# Patient Record
Sex: MALE | Race: WHITE | NOT HISPANIC OR LATINO | Employment: PART TIME | URBAN - METROPOLITAN AREA
[De-identification: names, ages, dates, MRNs, and addresses within clinical notes are randomized per-mention and may not be internally consistent; named-entity substitution may affect disease eponyms.]

---
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
## 2020-05-28 ENCOUNTER — OFFICE VISIT (OUTPATIENT)
Dept: FAMILY MEDICINE CLINIC | Facility: CLINIC | Age: 20
End: 2020-05-28

## 2020-05-28 VITALS
HEIGHT: 74 IN | HEART RATE: 85 BPM | WEIGHT: 281.6 LBS | BODY MASS INDEX: 36.14 KG/M2 | SYSTOLIC BLOOD PRESSURE: 121 MMHG | TEMPERATURE: 98.6 F | OXYGEN SATURATION: 98 % | DIASTOLIC BLOOD PRESSURE: 73 MMHG

## 2020-05-28 DIAGNOSIS — F41.9 ANXIETY: ICD-10-CM

## 2020-05-28 DIAGNOSIS — F99 INSOMNIA DUE TO OTHER MENTAL DISORDER: ICD-10-CM

## 2020-05-28 DIAGNOSIS — Z00.00 PREVENTATIVE HEALTH CARE: Primary | ICD-10-CM

## 2020-05-28 DIAGNOSIS — F51.05 INSOMNIA DUE TO OTHER MENTAL DISORDER: ICD-10-CM

## 2020-05-28 PROBLEM — Z83.3 FAMILY HISTORY OF DIABETES MELLITUS IN FIRST DEGREE RELATIVE: Status: RESOLVED | Noted: 2018-08-02 | Resolved: 2020-05-28

## 2020-05-28 PROBLEM — E66.3 OVERWEIGHT: Status: ACTIVE | Noted: 2018-08-02

## 2020-05-28 PROBLEM — G47.00 INSOMNIA: Status: ACTIVE | Noted: 2018-08-02

## 2020-05-28 PROBLEM — E66.3 OVERWEIGHT: Status: RESOLVED | Noted: 2018-08-02 | Resolved: 2020-05-28

## 2020-05-28 PROBLEM — Z83.3 FAMILY HISTORY OF DIABETES MELLITUS IN FIRST DEGREE RELATIVE: Status: ACTIVE | Noted: 2018-08-02

## 2020-05-28 PROBLEM — G47.00 INSOMNIA: Status: RESOLVED | Noted: 2018-08-02 | Resolved: 2020-05-28

## 2020-05-28 PROCEDURE — 99204 OFFICE O/P NEW MOD 45 MIN: CPT | Performed by: NURSE PRACTITIONER

## 2020-05-28 RX ORDER — PHENOL 1.4 %
10 AEROSOL, SPRAY (ML) MUCOUS MEMBRANE DAILY
Qty: 30 TABLET | Refills: 2 | Status: SHIPPED | OUTPATIENT
Start: 2020-05-28

## 2021-09-27 ENCOUNTER — TELEPHONE (OUTPATIENT)
Dept: FAMILY MEDICINE CLINIC | Facility: CLINIC | Age: 21
End: 2021-09-27

## 2021-09-27 NOTE — TELEPHONE ENCOUNTER
Pt needs an appt.  Tried calling pt 3 times to schedule.  No answer.  Phone just asks to enter mail box #.  [id number]

## 2021-09-27 NOTE — TELEPHONE ENCOUNTER
Caller: Joaquín Andrade    Relationship to patient: Self    Best call back number:[phone]    Chief complaint: ANXIETY    Type of visit: OFFICE VISIT    Requested date: ASAP    Additional notes:PATIENT STOPPED MEDICATION AND IS NEEDING TO GO  BACK ON IT ASAP. PLEASE CALL AND ADVISE.

## 2021-09-27 NOTE — TELEPHONE ENCOUNTER
HUB READ MESSAGE TO PATIENT - HE WAS NOT HAPPY ABOUT SCHEDULING NEXT AVAILABLE APPOINTMENT WITH [facility] ON 10/16/21.

## 2021-09-30 ENCOUNTER — OFFICE VISIT (OUTPATIENT)
Dept: FAMILY MEDICINE CLINIC | Facility: CLINIC | Age: 21
End: 2021-09-30

## 2021-09-30 VITALS
WEIGHT: 289.6 LBS | RESPIRATION RATE: 16 BRPM | DIASTOLIC BLOOD PRESSURE: 81 MMHG | HEART RATE: 67 BPM | OXYGEN SATURATION: 97 % | BODY MASS INDEX: 39.22 KG/M2 | SYSTOLIC BLOOD PRESSURE: 128 MMHG | TEMPERATURE: 98.9 F | HEIGHT: 72 IN

## 2021-09-30 DIAGNOSIS — F33.2 SEVERE EPISODE OF RECURRENT MAJOR DEPRESSIVE DISORDER, WITHOUT PSYCHOTIC FEATURES (HCC): Primary | ICD-10-CM

## 2021-09-30 DIAGNOSIS — F41.9 ANXIETY: ICD-10-CM

## 2021-09-30 PROCEDURE — 99214 OFFICE O/P EST MOD 30 MIN: CPT | Performed by: NURSE PRACTITIONER

## 2021-09-30 NOTE — ASSESSMENT & PLAN NOTE
Antidepressant restarted today.  Referral to mental health services and therapist.  Patient advised to go to the ER self-harm or harm towards others or other psychological issues.

## 2021-09-30 NOTE — ASSESSMENT & PLAN NOTE
Patient's depression is recurrent and is severe without psychosis. Their depression is currently active and the condition is worsening. This will be reassessed in 4 weeks. F/U as described:patient was prescribed an antidepressant medicine and patient referred to Mental Health Specialist.    Patient urged to call 911 if he has any thoughts of ideation harm towards others.  Patient verbalized understanding.

## 2021-09-30 NOTE — PROGRESS NOTES
"Chief Complaint  Depression    \"I am really depressed\".    Subjective          Joaquín Andrade presents to [facility]     21-year-old male patient presents today with severe depression.  This has been a recurrent issue for him the past few years.  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 His mother has a history of anxiety and depression as well as his siblings.      He reports he is restless, has difficulty sleeping, and feels anxious.  He reports that recently he has been \"having nightmares\" about his former EMT runs.  He has been off and on his antidepressant periodically over the last few years.  Previous notes show that patient stopped medication because he was feeling well.  Upon discussion today patient said that is why he did not get back on his medication was \"because he was doing well\".  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  He reports he is working three 13-hour shifts a week.  He has a fiancée who is about to finish school.  He also has a pet cat.    Patient reports that on Tuesday, September 28 he was considering self-harm.  He reports \"I was thinking about slitting my wrists until my cat walked in\".  He denies actually having anxiety reports it was \"only a thought\". He states he sat with the cat for a bit then informed his parents about what he was thinking. He reports they sat down and talked with him and encouraged him to make an appointment with his provider to get back on medication.      He reports that the past 6 months have been the worst and his fiancée has urged him to do either therapy or counseling or get back on medication.  He states \"I just been brushing and under the rug\".  Patient does not note any past trauma, sexual abuse, or other issues that may be triggering this depression.  However, encouraged patient that he needs to see someone to help learn healthy techniques to deal with some " "of the symptoms he is feeling while also on antidepressant therapy.  He denies any thoughts of self-harm today or harm towards others.    Instructed patient to go to the ER, [facility] or [facility] today however, he denies thoughts of self harm currently. Urged pt that if he has ANY thoughts of self-harm or harm towards others to seek emergency help right away.  Patient verbalized understanding.  He reports that he does not have suicidal thoughts today and will be leaving with his parents to go see his brother this weekend who is studying at [employer].    *Patient reports he did recover from Covid recently and was diagnosed 3 weeks ago.  He has no lingering symptoms to note.        Objective   Vital Signs:   /81 (BP Location: Right arm, Patient Position: Sitting, Cuff Size: Large Adult)   Pulse 67   Temp 98.9 °F (37.2 °C) (Infrared)   Resp 16   Ht 182.9 cm (72\")   Wt 131 kg (289 lb 9.6 oz)   SpO2 97%   BMI 39.28 kg/m²       Physical Exam  Constitutional:       Appearance: He is well-developed.      Comments: Wearing a face mask     HENT:      Head: Normocephalic and atraumatic.   Eyes:      Conjunctiva/sclera: Conjunctivae normal.   Cardiovascular:      Rate and Rhythm: Normal rate.   Pulmonary:      Effort: Pulmonary effort is normal.   Musculoskeletal:         General: Normal range of motion.      Cervical back: Normal range of motion.   Skin:     General: Skin is warm and dry.      Findings: No rash.   Neurological:      Mental Status: He is alert and oriented to person, place, and time.   Psychiatric:         Attention and Perception: He does not perceive auditory or visual hallucinations.         Mood and Affect: Mood is anxious and depressed. Affect is flat. Affect is not tearful.         Speech: Speech normal.         Behavior: Behavior is withdrawn. Behavior is not agitated, aggressive or hyperactive.         Thought Content: Thought content is not paranoid. Thought content includes suicidal ideation. " Thought content does not include homicidal ideation. Thought content does not include homicidal or suicidal plan.        Result Review :                 Assessment and Plan    Diagnoses and all orders for this visit:    1. Severe episode of recurrent major depressive disorder, without psychotic features (HCC) (Primary)  -     Ambulatory Referral to Behavioral Health    2. Anxiety    Other orders  -     sertraline (Zoloft) 50 MG tablet; Take 1 tablet by mouth Daily.  Dispense: 30 tablet; Refill: 0        Follow Up   Return in about 4 weeks (around 10/28/2021).  Patient was given instructions and counseling regarding his condition or for health maintenance advice. Please see specific information pulled into the AVS if appropriate.

## 2021-10-26 ENCOUNTER — OFFICE VISIT (OUTPATIENT)
Dept: FAMILY MEDICINE CLINIC | Facility: CLINIC | Age: 21
End: 2021-10-26

## 2021-10-26 VITALS
RESPIRATION RATE: 16 BRPM | DIASTOLIC BLOOD PRESSURE: 85 MMHG | HEART RATE: 71 BPM | OXYGEN SATURATION: 98 % | BODY MASS INDEX: 39.14 KG/M2 | HEIGHT: 72 IN | TEMPERATURE: 98.2 F | SYSTOLIC BLOOD PRESSURE: 128 MMHG | WEIGHT: 289 LBS

## 2021-10-26 DIAGNOSIS — F33.2 SEVERE EPISODE OF RECURRENT MAJOR DEPRESSIVE DISORDER, WITHOUT PSYCHOTIC FEATURES (HCC): Primary | ICD-10-CM

## 2021-10-26 DIAGNOSIS — F41.9 ANXIETY: ICD-10-CM

## 2021-10-26 DIAGNOSIS — Z00.00 ANNUAL PHYSICAL EXAM: ICD-10-CM

## 2021-10-26 PROCEDURE — 99213 OFFICE O/P EST LOW 20 MIN: CPT | Performed by: NURSE PRACTITIONER

## 2021-10-26 NOTE — ASSESSMENT & PLAN NOTE
Patient's depression is recurrent and is mild without psychosis. Their depression is currently in partial remission and the condition is improving with treatment. This will be reassessed in 3 months. F/U as described:patient will continue current medication therapy.  No medication adjustments at this time.

## 2021-10-26 NOTE — PROGRESS NOTES
"Chief Complaint  Annual Exam and Depression    \"Here to see how I'm doing with my meds\"    Subjective          Joaquín Andrade presents to [facility]     21-year-old male patient here for follow-up on depression and anxiety.  Patient reports he is \"feeling better overall \".  He notes that his anxiety is improved and when it occurs he is able to control it.  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  Patient denies needing anything to help with sleep however, I reiterated that if sleep is being disturbed he needs to reach out so we can address it.  Patient verbalized understanding.  He reports that his relationship is going well and his girlfriend is happy with his improved mood.  He denies suicidal ideation, thoughts of self-harm, or harm towards others.  Form patient if any of those thoughts come back he needs to seek emergency help right away.  Vital signs are normal and stable at today's visit.    Depression  Visit Type: follow-up  Patient presents with the following symptoms: nervousness/anxiety.  Patient is not experiencing: anhedonia, chest pain, choking sensation, decreased concentration, depressed mood, excessive worry, feelings of hopelessness, impotence, irritability, palpitations, panic, psychomotor agitation, shortness of breath, suicidal ideas, suicidal planning and thoughts of death.  Frequency of symptoms: occasionally   Severity: mild   Sleep quality: good (occassional nightmares)  Nighttime awakenings: occasional  Compliance with medications: 100% per patient.  Treatment side effects: denies any.       Objective   Vital Signs:   /85 (BP Location: Right arm, Patient Position: Sitting, Cuff Size: Adult)   Pulse 71   Temp 98.2 °F (36.8 °C) (Infrared)   Resp 16   Ht 182.9 cm (72\")   Wt 131 kg (289 lb)   SpO2 98%   BMI 39.20 kg/m²       Physical Exam  Constitutional:       Appearance: Normal appearance. He " is well-developed.      Comments: Wearing a face mask     HENT:      Head: Normocephalic and atraumatic.      Right Ear: Tympanic membrane, ear canal and external ear normal.      Left Ear: Tympanic membrane, ear canal and external ear normal.      Nose: Nose normal.      Mouth/Throat:      Mouth: Mucous membranes are moist.      Pharynx: Oropharynx is clear.   Eyes:      Conjunctiva/sclera: Conjunctivae normal.      Pupils: Pupils are equal, round, and reactive to light.   Cardiovascular:      Rate and Rhythm: Normal rate and regular rhythm.      Pulses: Normal pulses.      Heart sounds: Normal heart sounds.   Pulmonary:      Effort: Pulmonary effort is normal.      Breath sounds: Normal breath sounds.   Abdominal:      General: Bowel sounds are normal.      Palpations: Abdomen is soft.   Musculoskeletal:         General: Normal range of motion.      Cervical back: Normal range of motion and neck supple.   Lymphadenopathy:      Head:      Right side of head: No submental, submandibular, tonsillar, preauricular, posterior auricular or occipital adenopathy.      Left side of head: No submental, submandibular, tonsillar, preauricular, posterior auricular or occipital adenopathy.      Cervical: No cervical adenopathy.      Right cervical: No superficial, deep or posterior cervical adenopathy.     Left cervical: No superficial, deep or posterior cervical adenopathy.   Skin:     General: Skin is warm and dry.      Findings: No rash.   Neurological:      Mental Status: He is alert and oriented to person, place, and time.   Psychiatric:         Behavior: Behavior normal.        Result Review :                 Assessment and Plan    Diagnoses and all orders for this visit:    1. Severe episode of recurrent major depressive disorder, without psychotic features (HCC) (Primary)  Assessment & Plan:  Patient's depression is recurrent and is mild without psychosis. Their depression is currently in partial remission and the  condition is improving with treatment. This will be reassessed in 3 months. F/U as described:patient will continue current medication therapy.  No medication adjustments at this time.      2. Anxiety  Assessment & Plan:  Anxiety is improved.  Patient is able to control when episodes arise.      3. Annual physical exam  Assessment & Plan:  Physical performed.      Other orders  -     sertraline (Zoloft) 50 MG tablet; Take 1 tablet by mouth Daily.  Dispense: 30 tablet; Refill: 3      Follow Up   Return in about 3 months (around 1/26/2022).  Patient was given instructions and counseling regarding his condition or for health maintenance advice. Please see specific information pulled into the AVS if appropriate.

## 2021-11-12 ENCOUNTER — APPOINTMENT (OUTPATIENT)
Dept: GENERAL RADIOLOGY | Facility: HOSPITAL | Age: 21
End: 2021-11-12

## 2021-11-12 ENCOUNTER — HOSPITAL ENCOUNTER (EMERGENCY)
Facility: HOSPITAL | Age: 21
Discharge: HOME | End: 2021-11-12
Admitting: EMERGENCY MEDICINE

## 2021-11-12 VITALS
WEIGHT: 280 LBS | OXYGEN SATURATION: 97 % | TEMPERATURE: 97.8 F | SYSTOLIC BLOOD PRESSURE: 143 MMHG | BODY MASS INDEX: 37.93 KG/M2 | RESPIRATION RATE: 16 BRPM | HEART RATE: 94 BPM | DIASTOLIC BLOOD PRESSURE: 83 MMHG | HEIGHT: 72 IN

## 2021-11-12 DIAGNOSIS — D72.829 LEUKOCYTOSIS, UNSPECIFIED TYPE: ICD-10-CM

## 2021-11-12 DIAGNOSIS — E86.0 DEHYDRATION: ICD-10-CM

## 2021-11-12 DIAGNOSIS — R55 NEAR SYNCOPE: Primary | ICD-10-CM

## 2021-11-12 DIAGNOSIS — I95.1 ORTHOSTATIC HYPOTENSION: ICD-10-CM

## 2021-11-12 DIAGNOSIS — R82.4 KETONURIA: ICD-10-CM

## 2021-11-12 LAB
ALBUMIN SERPL-MCNC: 4.7 G/DL (ref 3.5–5.2)
ALBUMIN/GLOB SERPL: 1.7 G/DL
ALP SERPL-CCNC: 88 U/L (ref 39–117)
ALT SERPL W P-5'-P-CCNC: 29 U/L (ref 1–41)
ANION GAP SERPL CALCULATED.3IONS-SCNC: 12 MMOL/L (ref 5–15)
AST SERPL-CCNC: 24 U/L (ref 1–40)
BASOPHILS # BLD AUTO: 0 10*3/MM3 (ref 0–0.2)
BASOPHILS NFR BLD AUTO: 0.4 % (ref 0–1.5)
BILIRUB SERPL-MCNC: 0.6 MG/DL (ref 0–1.2)
BILIRUB UR QL STRIP: NEGATIVE
BUN SERPL-MCNC: 14 MG/DL (ref 6–20)
BUN/CREAT SERPL: 17.1 (ref 7–25)
CALCIUM SPEC-SCNC: 9.5 MG/DL (ref 8.6–10.5)
CHLORIDE SERPL-SCNC: 102 MMOL/L (ref 98–107)
CLARITY UR: CLEAR
CO2 SERPL-SCNC: 25 MMOL/L (ref 22–29)
COLOR UR: YELLOW
CREAT SERPL-MCNC: 0.82 MG/DL (ref 0.76–1.27)
DEPRECATED RDW RBC AUTO: 41.1 FL (ref 37–54)
EOSINOPHIL # BLD AUTO: 0.2 10*3/MM3 (ref 0–0.4)
EOSINOPHIL NFR BLD AUTO: 1.2 % (ref 0.3–6.2)
ERYTHROCYTE [DISTWIDTH] IN BLOOD BY AUTOMATED COUNT: 14.2 % (ref 12.3–15.4)
GFR SERPL CREATININE-BSD FRML MDRD: 117 ML/MIN/1.73
GLOBULIN UR ELPH-MCNC: 2.7 GM/DL
GLUCOSE SERPL-MCNC: 89 MG/DL (ref 65–99)
GLUCOSE UR STRIP-MCNC: NEGATIVE MG/DL
HCT VFR BLD AUTO: 45.1 % (ref 37.5–51)
HGB BLD-MCNC: 15.7 G/DL (ref 13–17.7)
HGB UR QL STRIP.AUTO: NEGATIVE
KETONES UR QL STRIP: ABNORMAL
LEUKOCYTE ESTERASE UR QL STRIP.AUTO: NEGATIVE
LYMPHOCYTES # BLD AUTO: 2.6 10*3/MM3 (ref 0.7–3.1)
LYMPHOCYTES NFR BLD AUTO: 20.1 % (ref 19.6–45.3)
MAGNESIUM SERPL-MCNC: 2.1 MG/DL (ref 1.6–2.6)
MCH RBC QN AUTO: 28.3 PG (ref 26.6–33)
MCHC RBC AUTO-ENTMCNC: 34.7 G/DL (ref 31.5–35.7)
MCV RBC AUTO: 81.5 FL (ref 79–97)
MONOCYTES # BLD AUTO: 0.7 10*3/MM3 (ref 0.1–0.9)
MONOCYTES NFR BLD AUTO: 5.3 % (ref 5–12)
NEUTROPHILS NFR BLD AUTO: 73 % (ref 42.7–76)
NEUTROPHILS NFR BLD AUTO: 9.5 10*3/MM3 (ref 1.7–7)
NITRITE UR QL STRIP: NEGATIVE
NRBC BLD AUTO-RTO: 0.1 /100 WBC (ref 0–0.2)
PH UR STRIP.AUTO: 7 [PH] (ref 5–8)
PLATELET # BLD AUTO: 237 10*3/MM3 (ref 140–450)
PMV BLD AUTO: 9.4 FL (ref 6–12)
POTASSIUM SERPL-SCNC: 4.1 MMOL/L (ref 3.5–5.2)
PROT SERPL-MCNC: 7.4 G/DL (ref 6–8.5)
PROT UR QL STRIP: NEGATIVE
RBC # BLD AUTO: 5.53 10*6/MM3 (ref 4.14–5.8)
SARS-COV-2 RNA PNL SPEC NAA+PROBE: NOT DETECTED
SODIUM SERPL-SCNC: 139 MMOL/L (ref 136–145)
SP GR UR STRIP: 1.02 (ref 1–1.03)
TROPONIN T SERPL-MCNC: <0.01 NG/ML (ref 0–0.03)
TSH SERPL DL<=0.05 MIU/L-ACNC: 1.42 UIU/ML (ref 0.27–4.2)
UROBILINOGEN UR QL STRIP: ABNORMAL
WBC # BLD AUTO: 13 10*3/MM3 (ref 3.4–10.8)

## 2021-11-12 PROCEDURE — 85025 COMPLETE CBC W/AUTO DIFF WBC: CPT | Performed by: PHYSICIAN ASSISTANT

## 2021-11-12 PROCEDURE — 84443 ASSAY THYROID STIM HORMONE: CPT | Performed by: PHYSICIAN ASSISTANT

## 2021-11-12 PROCEDURE — 81003 URINALYSIS AUTO W/O SCOPE: CPT | Performed by: PHYSICIAN ASSISTANT

## 2021-11-12 PROCEDURE — 71045 X-RAY EXAM CHEST 1 VIEW: CPT

## 2021-11-12 PROCEDURE — 99283 EMERGENCY DEPT VISIT LOW MDM: CPT

## 2021-11-12 PROCEDURE — 87635 SARS-COV-2 COVID-19 AMP PRB: CPT | Performed by: PHYSICIAN ASSISTANT

## 2021-11-12 PROCEDURE — 93005 ELECTROCARDIOGRAM TRACING: CPT | Performed by: PHYSICIAN ASSISTANT

## 2021-11-12 PROCEDURE — 83735 ASSAY OF MAGNESIUM: CPT | Performed by: PHYSICIAN ASSISTANT

## 2021-11-12 PROCEDURE — 36415 COLL VENOUS BLD VENIPUNCTURE: CPT

## 2021-11-12 PROCEDURE — 80053 COMPREHEN METABOLIC PANEL: CPT | Performed by: PHYSICIAN ASSISTANT

## 2021-11-12 PROCEDURE — 84484 ASSAY OF TROPONIN QUANT: CPT | Performed by: PHYSICIAN ASSISTANT

## 2021-11-12 RX ORDER — SODIUM CHLORIDE 0.9 % (FLUSH) 0.9 %
10 SYRINGE (ML) INJECTION AS NEEDED
Status: DISCONTINUED | OUTPATIENT
Start: 2021-11-12 | End: 2021-11-12 | Stop reason: HOSPADM

## 2021-11-12 RX ADMIN — SODIUM CHLORIDE 1000 ML: 9 INJECTION, SOLUTION INTRAVENOUS at 17:58

## 2021-11-12 NOTE — ED PROVIDER NOTES
Subjective       Patient is a 21-year-old male comes in complaining of lightheadedness and near syncopal episode while at work about 2 hours prior to arrival.  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  Patient states he did not have any vertigo symptoms or dizziness as previously stated in triage.  Patient states he had the feeling like he got up too fast and had tunnel vision and had to sit down and this relieved after several minutes.  Patient states he still fell off since that time.  Patient states he ate breakfast and lunch and had coffee today as well as hydrated with lots of water throughout the day today.  Patient otherwise denies fever, chills, headache, chest pain, shortness of breath, abdominal pain, nausea, vomiting, diarrhea, urinary symptoms or swelling in his legs bilaterally.  Patient states his work has been slightly more stressful recently as this is high season for him but states that his anxiety is under control and was recently started on medication about a month ago and denies any SI or HI since he started this medication.  Patient states he is set to start therapy in January for anxiety.          Review of Systems   Constitutional: Negative for chills, fatigue and fever.   HENT: Negative for congestion, sore throat, tinnitus and trouble swallowing.    Eyes: Negative for photophobia, discharge and visual disturbance.   Respiratory: Negative for cough, shortness of breath and wheezing.    Cardiovascular: Negative for chest pain, palpitations and leg swelling.   Gastrointestinal: Negative for abdominal pain, blood in stool, diarrhea, nausea and vomiting.   Genitourinary: Negative for dysuria, flank pain, frequency and urgency.   Musculoskeletal: Negative for arthralgias and myalgias.   Skin: Negative for rash.   Neurological: Positive for syncope (near syncope),  light-headedness and numbness. Negative for dizziness, weakness and headaches.   Psychiatric/Behavioral: Negative for confusion.       Past Medical History:   Diagnosis Date   • Anxiety    • Depression    • Family history of diabetes mellitus in first degree relative 8/2/2018       No Known Allergies    History reviewed. No pertinent surgical history.    Family History   Problem Relation Age of Onset   • Anxiety disorder Mother    • Depression Mother    • Diabetes Father        Social History     Socioeconomic History   • Marital status: Single   Tobacco Use   • Smoking status: Current Some Day Smoker     Types: Cigars   • Smokeless tobacco: Never Used   • Tobacco comment: rarely    Vaping Use   • Vaping Use: Never used   Substance and Sexual Activity   • Alcohol use: Yes     Comment: occasionally    • Drug use: Never   • Sexual activity: Defer           Objective   Physical Exam  Vitals and nursing note reviewed.   Constitutional:       General: He is not in acute distress.     Appearance: He is well-developed. He is not diaphoretic.      Comments: Patient appears somewhat anxious.   HENT:      Head: Normocephalic and atraumatic.      Right Ear: Ear canal and external ear normal.      Left Ear: Ear canal and external ear normal.      Nose: Nose normal.      Mouth/Throat:      Pharynx: No oropharyngeal exudate.   Eyes:      Extraocular Movements: Extraocular movements intact.      Conjunctiva/sclera: Conjunctivae normal.      Pupils: Pupils are equal, round, and reactive to light.   Cardiovascular:      Rate and Rhythm: Normal rate and regular rhythm.      Heart sounds: Normal heart sounds.      Comments: S1, S2 audible.  Pulmonary:      Effort: Pulmonary effort is normal. No respiratory distress.      Breath sounds: Normal breath sounds. No wheezing, rhonchi or rales.      Comments: On room air.  Abdominal:      General: Bowel sounds are normal. There is no distension.      Palpations: Abdomen is soft.       "Tenderness: There is no abdominal tenderness. There is no guarding or rebound.   Musculoskeletal:         General: No tenderness or deformity. Normal range of motion.      Cervical back: Normal range of motion.      Right lower leg: No edema.      Left lower leg: No edema.   Skin:     General: Skin is warm.      Capillary Refill: Capillary refill takes less than 2 seconds.      Findings: No erythema or rash.   Neurological:      Mental Status: He is alert and oriented to person, place, and time.      Cranial Nerves: No cranial nerve deficit.   Psychiatric:         Mood and Affect: Mood normal.         Behavior: Behavior normal.         Procedures           ED Course      /83 (BP Location: Right arm, Patient Position: Sitting)   Pulse 94   Temp 97.8 °F (36.6 °C)   Resp 16   Ht 182.9 cm (72\")   Wt 127 kg (280 lb)   SpO2 97%   BMI 37.97 kg/m²   Labs Reviewed   CBC WITH AUTO DIFFERENTIAL - Abnormal; Notable for the following components:       Result Value    WBC 13.00 (*)     Neutrophils, Absolute 9.50 (*)     All other components within normal limits    Narrative:     Result checked     URINALYSIS W/ CULTURE IF INDICATED - Abnormal; Notable for the following components:    Ketones, UA Trace (*)     All other components within normal limits    Narrative:     Urine microscopic not indicated.   COVID-19,CEPHEID/LISA/BDMAX,COR/RAFAELA/PAD/ISABEL IN-HOUSE,NP SWAB IN TRANSPORT MEDIA 3-4 HR TAT, RT-PCR - Normal    Narrative:     Fact sheet for providers: https://www.fda.gov/media/018500/download     Fact sheet for patients: https://www.fda.gov/media/868515/download  Fact sheet for providers: https://www.fda.gov/media/345495/download    Fact sheet for patients: https://www.fda.gov/media/430601/download    Test performed by PCR.   TROPONIN (IN-HOUSE) - Normal    Narrative:     Troponin T Reference Range:  <= 0.03 ng/mL-   Negative for AMI  >0.03 ng/mL-     Abnormal for myocardial necrosis.  Clinicians would have to " utilize clinical acumen, EKG, Troponin and serial changes to determine if it is an Acute Myocardial Infarction or myocardial injury due to an underlying chronic condition.       Results may be falsely decreased if patient taking Biotin.     TSH - Normal   MAGNESIUM - Normal   COMPREHENSIVE METABOLIC PANEL    Narrative:     GFR Normal >60  Chronic Kidney Disease <60  Kidney Failure <15     CBC AND DIFFERENTIAL    Narrative:     The following orders were created for panel order CBC & Differential.  Procedure                               Abnormality         Status                     ---------                               -----------         ------                     CBC Auto Differential[[id number]]        Abnormal            Final result                 Please view results for these tests on the individual orders.     XR Chest 1 View    Result Date: 11/12/2021  Low lung volumes. There is no active pulmonary disease.  Electronically Signed By-Rodolfo Rick MD On:11/12/2021 5:45 PM This report was finalized on [id number] by  Rodolfo Rick MD.                                         MDM  Number of Diagnoses or Management Options     Amount and/or Complexity of Data Reviewed  Clinical lab tests: reviewed  Tests in the radiology section of CPT®: reviewed  Tests in the medicine section of CPT®: reviewed    Risk of Complications, Morbidity, and/or Mortality  Presenting problems: low  Diagnostic procedures: low  Management options: low    Patient Progress  Patient progress: improved       Chart review:    EKG: EKG reviewed by myself interpreted by , shows sinus rhythm 65 bpm, probable normal early repolarization pattern, no ST elevation apparent.    Imaging:    XR Chest 1 View   Final Result   Low lung volumes. There is no active pulmonary disease.       Electronically Signed By-Rodolfo Rick MD On:11/12/2021 5:45 PM   This report was finalized on [id number] by  Rodolfo Rick MD.          Labs: UA shows trace  "ketones, white blood cell count of 13,000 otherwise unremarkable CBC, CMP, magnesium, TSH and initial troponin negative.  COVID-19 swab negative.    Vitals:  /83 (BP Location: Right arm, Patient Position: Sitting)   Pulse 94   Temp 97.8 °F (36.6 °C)   Resp 16   Ht 182.9 cm (72\")   Wt 127 kg (280 lb)   SpO2 97%   BMI 37.97 kg/m²     Medications given:    Medications   sodium chloride 0.9 % bolus 1,000 mL (0 mL Intravenous Stopped 11/12/21 1828)       Procedures:    MDM: Patient is a 21-year-old male comes in complaining of near syncopal episode at work today.  Patient has symptoms consistent with dehydration as patient is orthostatic here in the ER and has trace ketones in his urine.  Patient was given 1 L normal saline bolus and patient reports improvement of symptoms.  White blood cell count was elevated at 13,000 but patient has no signs of infection on urine or chest x-ray.  Initial troponin and EKG are unremarkable.  CMP unremarkable for acute findings as well.  COVID-19 swab negative.  TSH and magnesium are normal.  Patient to follow-up with primary care in 1 week's time for lab recheck regarding leukocytosis and patient voiced understanding.  Patient was given strict return precautions and voiced understanding.  Patient was encouraged to hydrate more thoroughly throughout the day and patient voiced understanding of this plan. See full discharge instructions for further details.  Results and plan discussed with patient and is agreeable with plan.    Final diagnoses:   Near syncope   Orthostatic hypotension   Dehydration   Ketonuria   Leukocytosis, unspecified type       ED Disposition  ED Disposition     ED Disposition Condition Comment    Discharge Stable           [facility] EMERGENCY DEPARTMENT  [address] [facility] [address] [phone]  Go in 1 day  As needed, If symptoms worsen    Niya Andino, APRN  [address]  [address]  [place] " [address] [phone]    Call in 1 week  As needed for lab recheck for elevated WBC         Medication List      Changed    Melatonin 10 MG tablet  Take 1 tablet by mouth Daily. One tab at bedtime  What changed:   · when to take this  · reasons to take this  · additional instructions             Zeke Khan PA  11/12/21 1236

## 2021-11-13 NOTE — DISCHARGE INSTRUCTIONS
Please continue to hydrate with plenty of fluids throughout the day especially while working.  Please follow-up with your primary care provider in 1 week's time for lab recheck and symptom follow-up.  Please come back to the ER if you pass out or have high fever or severe pain as you will need reevaluation at that time.

## 2021-11-16 LAB — QT INTERVAL: 377 MS

## 2022-01-04 ENCOUNTER — OFFICE VISIT (OUTPATIENT)
Dept: FAMILY MEDICINE CLINIC | Facility: CLINIC | Age: 22
End: 2022-01-04

## 2022-01-04 ENCOUNTER — TELEPHONE (OUTPATIENT)
Dept: FAMILY MEDICINE CLINIC | Facility: CLINIC | Age: 22
End: 2022-01-04

## 2022-01-04 VITALS
SYSTOLIC BLOOD PRESSURE: 151 MMHG | OXYGEN SATURATION: 97 % | DIASTOLIC BLOOD PRESSURE: 84 MMHG | HEIGHT: 72 IN | BODY MASS INDEX: 39.44 KG/M2 | HEART RATE: 87 BPM | RESPIRATION RATE: 18 BRPM | WEIGHT: 291.2 LBS | TEMPERATURE: 99.3 F

## 2022-01-04 DIAGNOSIS — M41.24 OTHER IDIOPATHIC SCOLIOSIS, THORACIC REGION: ICD-10-CM

## 2022-01-04 DIAGNOSIS — M54.6 ACUTE RIGHT-SIDED THORACIC BACK PAIN: Primary | ICD-10-CM

## 2022-01-04 DIAGNOSIS — M51.34 DDD (DEGENERATIVE DISC DISEASE), THORACIC: Primary | ICD-10-CM

## 2022-01-04 PROCEDURE — 99214 OFFICE O/P EST MOD 30 MIN: CPT | Performed by: NURSE PRACTITIONER

## 2022-01-04 RX ORDER — METHYLPREDNISOLONE 4 MG/1
TABLET ORAL
Qty: 21 EACH | Refills: 0 | Status: SHIPPED | OUTPATIENT
Start: 2022-01-04 | End: 2022-01-09

## 2022-01-04 RX ORDER — CYCLOBENZAPRINE HCL 5 MG
5 TABLET ORAL 3 TIMES DAILY PRN
Qty: 9 TABLET | Refills: 0 | Status: SHIPPED | OUTPATIENT
Start: 2022-01-04 | End: 2022-01-27 | Stop reason: SDUPTHER

## 2022-01-04 NOTE — ASSESSMENT & PLAN NOTE
Advised patient to apply heat and ice.  We will send in low-dose Medrol pack and a few days of Flexeril.

## 2022-01-04 NOTE — PROGRESS NOTES
These advise Joaquín that he has a T4 and T5 degenerative disc disease.  This is mild but this is probably pinching a nerve that is causing his discomfort.  I have sent in steroids and muscle relaxers for his discomfort.  If he is having continuation of symptoms he can return to me and we will elaborate further on what to do next

## 2022-01-04 NOTE — PROGRESS NOTES
"Chief Complaint  Back Pain    Subjective          Joaquín Andrade presents to [facility]      History of Present Illness    Joaquín is a 21-year-old male patient who presents today with back pain.  He reports that yesterday he came home from work, showered, laid down for bed and back was with \"sharp pain that almost made him vomit\". Pain was \"intense\" and rated as an \"8-9/10\" last night and a 3-4/10 today. He did take 800 mg ibuprofen appx 2 am. He had to sleep on floor to flatten his back for comfort. He was able to sleep a little however, he did not get much rest. He reports the pain is still present and mostly when he breathes in and out. He does not recall any trauma, heavy lifting, issues before this pain began.  I will obtain a thoracic x-ray.  Most likely patient has an impinged nerve or a muscle strain.     Objective     Vital Signs:   /84 (BP Location: Left arm, Patient Position: Sitting, Cuff Size: Adult)   Pulse 87   Temp 99.3 °F (37.4 °C) (Infrared)   Resp 18   Ht 182.9 cm (72\")   Wt 132 kg (291 lb 3.2 oz)   SpO2 97%   BMI 39.49 kg/m²           Physical Exam  Constitutional:       Appearance: Normal appearance. He is well-developed.      Comments: Wearing a face mask     HENT:      Head: Normocephalic and atraumatic.   Eyes:      Conjunctiva/sclera: Conjunctivae normal.   Cardiovascular:      Rate and Rhythm: Normal rate.   Pulmonary:      Effort: Pulmonary effort is normal.   Musculoskeletal:         General: Normal range of motion.      Cervical back: Normal and normal range of motion.      Thoracic back: Spasms and tenderness present. No swelling, edema, deformity, signs of trauma, lacerations or bony tenderness. Normal range of motion. No scoliosis.        Back:    Skin:     General: Skin is warm and dry.      Findings: No rash.   Neurological:      Mental Status: He is alert and oriented to person, place, and time.   Psychiatric:         Behavior: " Behavior normal.                Result Review :                                   Assessment and Plan      Diagnoses and all orders for this visit:    1. Acute right-sided thoracic back pain (Primary)  Assessment & Plan:  Advised patient to apply heat and ice.  We will send in low-dose Medrol pack and a few days of Flexeril.    Orders:  -     XR Spine Thoracic 2 View (In Office)    Other orders  -     methylPREDNISolone (MEDROL) 4 MG dose pack; Take as directed on package instructions.  Dispense: 21 each; Refill: 0  -     cyclobenzaprine (FLEXERIL) 5 MG tablet; Take 1 tablet by mouth 3 (Three) Times a Day As Needed for Muscle Spasms.  Dispense: 9 tablet; Refill: 0          Follow Up       Return for Next scheduled follow up, with SURYA Roberts.      Patient was given instructions and counseling regarding his condition or for health maintenance advice. Please see specific information pulled into the AVS if appropriate.     Niya Andino, APRN1/4/202215:43 EST  This note has been electronically signed

## 2022-01-04 NOTE — TELEPHONE ENCOUNTER
HUB TO READ    MY CHART MESSAGE     These advise Joaquín that he has a T4 and T5 degenerative disc disease.  This is mild but this is probably pinching a nerve that is causing his discomfort.  I have sent in steroids and muscle relaxers for his discomfort.  If he is having continuation of symptoms he can return to me and we will elaborate further on what to do next

## 2022-01-27 ENCOUNTER — OFFICE VISIT (OUTPATIENT)
Dept: FAMILY MEDICINE CLINIC | Facility: CLINIC | Age: 22
End: 2022-01-27

## 2022-01-27 VITALS
RESPIRATION RATE: 18 BRPM | BODY MASS INDEX: 39.82 KG/M2 | DIASTOLIC BLOOD PRESSURE: 88 MMHG | HEIGHT: 72 IN | OXYGEN SATURATION: 98 % | HEART RATE: 80 BPM | SYSTOLIC BLOOD PRESSURE: 135 MMHG | WEIGHT: 294 LBS | TEMPERATURE: 98.6 F

## 2022-01-27 DIAGNOSIS — M51.34 DDD (DEGENERATIVE DISC DISEASE), THORACIC: ICD-10-CM

## 2022-01-27 DIAGNOSIS — F41.9 ANXIETY: Primary | ICD-10-CM

## 2022-01-27 PROCEDURE — 99214 OFFICE O/P EST MOD 30 MIN: CPT | Performed by: NURSE PRACTITIONER

## 2022-01-27 RX ORDER — SERTRALINE HYDROCHLORIDE 100 MG/1
100 TABLET, FILM COATED ORAL DAILY
Qty: 30 TABLET | Refills: 1 | Status: SHIPPED | OUTPATIENT
Start: 2022-01-27 | End: 2022-02-09 | Stop reason: SDUPTHER

## 2022-01-27 RX ORDER — CYCLOBENZAPRINE HCL 5 MG
5 TABLET ORAL 3 TIMES DAILY PRN
Qty: 12 TABLET | Refills: 0 | Status: SHIPPED | OUTPATIENT
Start: 2022-01-27 | End: 2022-02-09 | Stop reason: SDUPTHER

## 2022-01-27 RX ORDER — SERTRALINE HYDROCHLORIDE 100 MG/1
100 TABLET, FILM COATED ORAL DAILY
Qty: 30 TABLET | Refills: 1 | Status: SHIPPED | OUTPATIENT
Start: 2022-01-27 | End: 2022-01-27 | Stop reason: SDUPTHER

## 2022-01-27 NOTE — PROGRESS NOTES
"Chief Complaint  Depression and Back Pain    Subjective          Joaquín Andrade presents to [facility]      Joaquín is a 21-year-old male patient who presents today to follow-up on his anxiety.  He is currently taking Zoloft 50 mg daily.  We restarted him on this medication at the end of September 2021.  We had a follow-up 1 month later and he was doing well.    He reports very small episodes of anxiety that are remaining.  He did start seeing a therapist at [facility] this past week.  He reports his next appointment is the middle of February and his therapist will be seeing him every 2 weeks once she is able to open her private practice.  Her name is Leighann Cm.  He does report having a good rapport with a therapist she specializes in trauma.  She did recommend that he talk to me about going up on the dose of Zoloft due to some lingering anxieties      Additionally, he was recently seen in January 4 for acute right-sided thoracic back pain.  Apparently he had degenerative disc disease in L4 and L5.  He reports his back is doing well today however he does have some bad days and some good days.  We did have a discussion about physical therapy for his back versus referring to orthopedics.  Patient said he was think about this and follow back up to let me know if he is interested.  I did go ahead and refill some Flexeril for him as needed.  Advised patient about setting effects and to follow-up if he has any concerns or issues.       Objective     Vital Signs:   /88 (BP Location: Left arm, Patient Position: Sitting, Cuff Size: Large Adult)   Pulse 80   Temp 98.6 °F (37 °C) (Infrared)   Resp 18   Ht 182.9 cm (72\")   Wt 133 kg (294 lb)   SpO2 98%   BMI 39.87 kg/m²           Physical Exam  Vitals reviewed.   Constitutional:       Appearance: Normal appearance. He is well-developed.      Comments: Wearing a face mask     HENT:      Head: Normocephalic and atraumatic. "   Eyes:      Conjunctiva/sclera: Conjunctivae normal.   Cardiovascular:      Rate and Rhythm: Normal rate and regular rhythm.      Pulses: Normal pulses.      Heart sounds: Normal heart sounds.   Pulmonary:      Effort: Pulmonary effort is normal.      Breath sounds: Normal breath sounds.   Musculoskeletal:         General: Normal range of motion.      Cervical back: Normal range of motion.      Lumbar back: Normal. No swelling, deformity, spasms or tenderness.   Skin:     General: Skin is warm and dry.      Findings: No rash.   Neurological:      Mental Status: He is alert and oriented to person, place, and time.   Psychiatric:         Attention and Perception: Attention normal.         Mood and Affect: Mood normal.         Speech: Speech normal.         Behavior: Behavior normal. Behavior is cooperative.         Thought Content: Thought content normal.         Cognition and Memory: Cognition normal.                Result Review :                                   Assessment and Plan {CC Problem List  Visit Diagnosis   ROS  Review (Popup)  SGB Maintenance  Quality  BestPractice  Medications  SmartSets  SnapShot Encounters  Media :23}     Diagnoses and all orders for this visit:    1. Anxiety (Primary)  Assessment & Plan:  Anxiety is improved however, patient does still have some lingering anxiety day-to-day.  We are upping his dose of Zoloft from 50 mg daily to 100 mg daily.  Advised patient to use remaining dose at home to increase this dose and I will send in a refill.  He is to follow back up in 1 month      2. DDD (degenerative disc disease), thoracic  Assessment & Plan:  Patient's back is feeling better however he does still have some flareups of pain and spasm.  Sent Flexeril in for when he is needing it.  Patient to let me know if he is going to follow physical therapy or not      Other orders  -     Discontinue: sertraline (Zoloft) 100 MG tablet; Take 1 tablet by mouth Daily.  Dispense: 30  tablet; Refill: 1  -     cyclobenzaprine (FLEXERIL) 5 MG tablet; Take 1 tablet by mouth 3 (Three) Times a Day As Needed for Muscle Spasms.  Dispense: 12 tablet; Refill: 0  -     sertraline (Zoloft) 100 MG tablet; Take 1 tablet by mouth Daily.  Dispense: 30 tablet; Refill: 1          Follow Up       No follow-ups on file.      Patient was given instructions and counseling regarding his condition or for health maintenance advice. Please see specific information pulled into the AVS if appropriate.     Niya Andino, APRN1/27/202215:28 EST  This note has been electronically signed

## 2022-01-27 NOTE — ASSESSMENT & PLAN NOTE
Anxiety is improved however, patient does still have some lingering anxiety day-to-day.  We are upping his dose of Zoloft from 50 mg daily to 100 mg daily.  Advised patient to use remaining dose at home to increase this dose and I will send in a refill.  He is to follow back up in 1 month

## 2022-01-27 NOTE — ASSESSMENT & PLAN NOTE
Patient's back is feeling better however he does still have some flareups of pain and spasm.  Sent Flexeril in for when he is needing it.  Patient to let me know if he is going to follow physical therapy or not

## 2022-02-09 NOTE — TELEPHONE ENCOUNTER
Caller: Joaquín Andrade    Relationship: Self    Requested Prescriptions:   Requested Prescriptions     Pending Prescriptions Disp Refills   • cyclobenzaprine (FLEXERIL) 5 MG tablet 12 tablet 0     Sig: Take 1 tablet by mouth 3 (Three) Times a Day As Needed for Muscle Spasms.   • sertraline (Zoloft) 100 MG tablet 30 tablet 1     Sig: Take 1 tablet by mouth Daily.        Pharmacy where request should be sent: [place] [phone] [place] [phone]

## 2022-02-10 RX ORDER — SERTRALINE HYDROCHLORIDE 100 MG/1
100 TABLET, FILM COATED ORAL DAILY
Qty: 30 TABLET | Refills: 1 | Status: SHIPPED | OUTPATIENT
Start: 2022-02-10 | End: 2022-03-04 | Stop reason: SDUPTHER

## 2022-02-10 RX ORDER — CYCLOBENZAPRINE HCL 5 MG
5 TABLET ORAL 3 TIMES DAILY PRN
Qty: 12 TABLET | Refills: 0 | Status: SHIPPED | OUTPATIENT
Start: 2022-02-10

## 2022-03-04 ENCOUNTER — TELEMEDICINE (OUTPATIENT)
Dept: FAMILY MEDICINE CLINIC | Facility: CLINIC | Age: 22
End: 2022-03-04

## 2022-03-04 DIAGNOSIS — F41.9 ANXIETY: Primary | ICD-10-CM

## 2022-03-04 PROCEDURE — 99441 PR PHYS/QHP TELEPHONE EVALUATION 5-10 MIN: CPT | Performed by: NURSE PRACTITIONER

## 2022-03-04 RX ORDER — SERTRALINE HYDROCHLORIDE 100 MG/1
100 TABLET, FILM COATED ORAL DAILY
Qty: 90 TABLET | Refills: 1 | Status: SHIPPED | OUTPATIENT
Start: 2022-03-04

## 2022-03-04 NOTE — PROGRESS NOTES
"Mode of Visit: Video  Location of patient: home  You have chosen to receive care through a telehealth visit.  The patient has signed the video visit consent form.  The visit was switched to a telephone encounter as patient was having difficulties connecting to A/V therefore we had a technical issue.  Patient was okay with changing the visit.      Pt had issues connecting to Zoom.     1545 Call Started     1554 Call ended     Total time 9 minutes      Chief Complaint  Anxiety    Subjective          Joaquín Andrade presents to [facility]     History of Present Illness K liliam Yanes is a 21 year old male patient who presents today for one month follow up on his anxiety.  He is currently taking Zoloft 100 mg daily. At his last visit, we bumped him up from 50 mg to 100.  Patient reports that \"medication is doing a fantastic job and I am feeling really good \".  He reports he has not felt this well in a long time.  He denies any side effects.  He reports that he would like to do a Zoom follow-up in 6 months and then return in office for his annual physical.  I advised him that we will be fine.  However, I did reiterate that if he is having any increased anxiety or depression or any other complications, concerns, or issues he should reach out to me sooner so that we can address those.  Pt verbalized understanding.  We will continue Zoloft at 100 mg.        Objective   Vital Signs:   There were no vitals taken for this visit.      Virtual Visit Physical Exam     There was no physical exam completed for this visit as it turned into a telephone call however, patient verbalized that he was not in any distress and there was no audible distress heard while communicating.  He was able to talk with ease and without pause.    Result Review :                 Assessment and Plan    Diagnoses and all orders for this visit:    1. Anxiety (Primary)        Follow Up   Return in about 6 months " (around 9/4/2022) for with SURYA Roberts, Video visit.  Patient was given instructions and counseling regarding his condition or for health maintenance advice. Please see specific information pulled into the AVS if appropriate.

## 2022-12-05 ENCOUNTER — OFFICE VISIT (OUTPATIENT)
Dept: FAMILY MEDICINE CLINIC | Facility: CLINIC | Age: 22
End: 2022-12-05

## 2022-12-05 VITALS
BODY MASS INDEX: 38.13 KG/M2 | WEIGHT: 281.5 LBS | DIASTOLIC BLOOD PRESSURE: 62 MMHG | HEIGHT: 72 IN | TEMPERATURE: 98.1 F | HEART RATE: 65 BPM | OXYGEN SATURATION: 97 % | SYSTOLIC BLOOD PRESSURE: 120 MMHG

## 2022-12-05 DIAGNOSIS — Z23 NEED FOR VACCINE FOR DT (DIPHTHERIA-TETANUS): ICD-10-CM

## 2022-12-05 DIAGNOSIS — Z23 NEED FOR PNEUMOCOCCAL VACCINATION: ICD-10-CM

## 2022-12-05 DIAGNOSIS — Z83.3 FAMILY HISTORY OF DIABETES MELLITUS: ICD-10-CM

## 2022-12-05 DIAGNOSIS — Z23 NEED FOR INFLUENZA VACCINATION: ICD-10-CM

## 2022-12-05 DIAGNOSIS — Z83.42 FAMILY HISTORY OF HIGH CHOLESTEROL: ICD-10-CM

## 2022-12-05 DIAGNOSIS — Z00.00 ANNUAL PHYSICAL EXAM: Primary | ICD-10-CM

## 2022-12-05 PROCEDURE — 90715 TDAP VACCINE 7 YRS/> IM: CPT | Performed by: NURSE PRACTITIONER

## 2022-12-05 PROCEDURE — 90686 IIV4 VACC NO PRSV 0.5 ML IM: CPT | Performed by: NURSE PRACTITIONER

## 2022-12-05 PROCEDURE — 99395 PREV VISIT EST AGE 18-39: CPT | Performed by: NURSE PRACTITIONER

## 2022-12-05 PROCEDURE — 90472 IMMUNIZATION ADMIN EACH ADD: CPT | Performed by: NURSE PRACTITIONER

## 2022-12-05 PROCEDURE — 90732 PPSV23 VACC 2 YRS+ SUBQ/IM: CPT | Performed by: NURSE PRACTITIONER

## 2022-12-05 PROCEDURE — 90471 IMMUNIZATION ADMIN: CPT | Performed by: NURSE PRACTITIONER

## 2022-12-05 NOTE — PROGRESS NOTES
"Chief Complaint  Annual Exam    Subjective          Joaquín Andrade presents to [facility]      History of Present Illness    Joaquín is a 22-year-old male patient who presents today for annual physical.      He has a past medical history depression, anxiety, insomnia.  Takes he currently takes Zoloft for his depression which is keeping symptoms at bay.  He is not with any side effects or unwanted symptoms. Melatonin for his insomnia as needed.  He is not having any issues.    Family history of diabetes and cholesterol issues. Patient has lost 10 pounds since January. His BMI is 38.17.  Father diagnosed in his early 20s with diabetes. Patient is wanting to get his labs checked to ensure health promotion and tackling issues if they are underlying.  He is fasting today.    He is also behind on a few vaccines influenza, tetanus and pneumonia vaccine. We will administer today.     Blood pressure is excellent at 120/62.  He has no other issues or concerns today.    Objective     Vital Signs:   /62 (BP Location: Right arm, Patient Position: Sitting, Cuff Size: Adult)   Pulse 65   Temp 98.1 °F (36.7 °C) (Oral)   Ht 182.9 cm (72.01\")   Wt 128 kg (281 lb 8 oz)   SpO2 97%   BMI 38.17 kg/m²           Physical Exam  Vitals reviewed.   Constitutional:       Appearance: Normal appearance. He is well-developed.      Comments: Wearing a face mask     HENT:      Head: Normocephalic and atraumatic.      Nose: Nose normal.      Mouth/Throat:      Mouth: Mucous membranes are moist.      Pharynx: Oropharynx is clear.   Eyes:      Conjunctiva/sclera: Conjunctivae normal.      Pupils: Pupils are equal, round, and reactive to light.   Cardiovascular:      Rate and Rhythm: Normal rate and regular rhythm.      Pulses: Normal pulses.      Heart sounds: Normal heart sounds.   Pulmonary:      Effort: Pulmonary effort is normal.      Breath sounds: Normal breath sounds.   Abdominal:      General: " Bowel sounds are normal.      Palpations: Abdomen is soft.   Musculoskeletal:         General: Normal range of motion.      Cervical back: Normal range of motion.   Skin:     General: Skin is warm and dry.      Findings: No rash.   Neurological:      Mental Status: He is alert and oriented to person, place, and time.   Psychiatric:         Attention and Perception: Attention and perception normal.         Mood and Affect: Mood and affect normal.         Speech: Speech normal.         Behavior: Behavior normal. Behavior is cooperative.         Thought Content: Thought content normal.         Cognition and Memory: Cognition normal.                Result Review :                                   Assessment and Plan      Diagnoses and all orders for this visit:    1. Annual physical exam (Primary)  -     CBC (No Diff)  -     Comprehensive metabolic panel    2. Family history of diabetes mellitus  -     Lipid Panel With LDL / HDL Ratio  -     Hemoglobin A1c    3. Family history of high cholesterol  -     Lipid Panel With LDL / HDL Ratio  -     Hemoglobin A1c    4. Need for influenza vaccination  -     FluLaval/Fluarix/Fluzone >6 Months    5. Need for vaccine for DT (diphtheria-tetanus)  -     Tdap Vaccine Greater Than or Equal To 8yo IM    6. Need for pneumococcal vaccination  -     Pneumococcal Polysaccharide Vaccine 23-Valent Greater Than or Equal To 1yo Subcutaneous / IM      Patient here for annual exam.  No's abnormalities noted on examination today.  His mental health is doing well.  He is getting 3 vaccinations today that he was behind on, influenza, DTaP, pneumococcal vaccine.  We will complete fasting labs and discussed with patient when they return.    The patient was counseled regarding nutrition, physical activity, healthy weight, injury prevention, misuse of tobacco, alcohol and illicit drugs, sexual behavior and STI's, contraception, dental health, mental health, immunizations, and  screenings.        Follow Up       No follow-ups on file.      Patient was given instructions and counseling regarding his condition or for health maintenance advice. Please see specific information pulled into the AVS if appropriate.     Niya Andino, APRN12/5/202212:35 EST  This note has been electronically signed

## 2022-12-06 LAB
ALBUMIN SERPL-MCNC: 4.5 G/DL (ref 4.1–5.2)
ALBUMIN/GLOB SERPL: 2.1 {RATIO} (ref 1.2–2.2)
ALP SERPL-CCNC: 79 IU/L (ref 44–121)
ALT SERPL-CCNC: 19 IU/L (ref 0–44)
AST SERPL-CCNC: 14 IU/L (ref 0–40)
BILIRUB SERPL-MCNC: 0.6 MG/DL (ref 0–1.2)
BUN SERPL-MCNC: 13 MG/DL (ref 6–20)
BUN/CREAT SERPL: 16 (ref 9–20)
CALCIUM SERPL-MCNC: 9.5 MG/DL (ref 8.7–10.2)
CHLORIDE SERPL-SCNC: 108 MMOL/L (ref 96–106)
CHOLEST SERPL-MCNC: 180 MG/DL (ref 100–199)
CO2 SERPL-SCNC: 22 MMOL/L (ref 20–29)
CREAT SERPL-MCNC: 0.82 MG/DL (ref 0.76–1.27)
EGFRCR SERPLBLD CKD-EPI 2021: 127 ML/MIN/1.73
ERYTHROCYTE [DISTWIDTH] IN BLOOD BY AUTOMATED COUNT: 12.6 % (ref 11.6–15.4)
GLOBULIN SER CALC-MCNC: 2.1 G/DL (ref 1.5–4.5)
GLUCOSE SERPL-MCNC: 97 MG/DL (ref 70–99)
HBA1C MFR BLD: 5.4 % (ref 4.8–5.6)
HCT VFR BLD AUTO: 46.9 % (ref 37.5–51)
HDLC SERPL-MCNC: 27 MG/DL
HGB BLD-MCNC: 15.2 G/DL (ref 13–17.7)
LDLC SERPL CALC-MCNC: 132 MG/DL (ref 0–99)
LDLC/HDLC SERPL: 4.9 RATIO (ref 0–3.6)
MCH RBC QN AUTO: 27 PG (ref 26.6–33)
MCHC RBC AUTO-ENTMCNC: 32.4 G/DL (ref 31.5–35.7)
MCV RBC AUTO: 83 FL (ref 79–97)
PLATELET # BLD AUTO: 278 X10E3/UL (ref 150–450)
POTASSIUM SERPL-SCNC: 4.8 MMOL/L (ref 3.5–5.2)
PROT SERPL-MCNC: 6.6 G/DL (ref 6–8.5)
RBC # BLD AUTO: 5.63 X10E6/UL (ref 4.14–5.8)
SODIUM SERPL-SCNC: 143 MMOL/L (ref 134–144)
TRIGL SERPL-MCNC: 115 MG/DL (ref 0–149)
VLDLC SERPL CALC-MCNC: 21 MG/DL (ref 5–40)
WBC # BLD AUTO: 6.2 X10E3/UL (ref 3.4–10.8)

## 2022-12-06 NOTE — PROGRESS NOTES
Please advise Joaquín that his labs have returned.  His blood counts , kidney, liver, electrolyte panel are excellent.    His lipid panel is okay.  The biggest issue with this is his HDL which is below normal.  HDL is the healthy cholesterol that helps protect the cardiac system.  Due to the low levels of HDL, he is at a moderate risk for a cardiac event in his lifetime.      I would recommend him to take niacin over-the-counter, increase his exercise, lose about 5% of his weight, eat more fish and follow oil, and avoid carbohydrates.

## 2023-05-10 NOTE — TELEPHONE ENCOUNTER
PLEASE CALL IF PATIENT NEEDS AN APPT    Caller: Joaquín Andrade    Relationship: Self    Best call back number: *[phone] (Mobile)    Requested Prescriptions:   Requested Prescriptions     Pending Prescriptions Disp Refills   • sertraline (Zoloft) 100 MG tablet 90 tablet 1     Sig: Take 1 tablet by mouth Daily.        Pharmacy where request should be sent: [facility] [address] [place], [place] [phone] [place], [place] [phone] FX     Last office visit with prescribing clinician: 12/5/2022   Last telemedicine visit with prescribing clinician: 12/5/2022   Next office visit with prescribing clinician: Visit date not found     Additional details provided by patient:     Does the patient have less than a 3 day supply:  [x] Yes  [] No    Would you like a call back once the refill request has been completed: [x] Yes [] No    If the office needs to give you a call back, can they leave a voicemail: [] Yes [] No    Daysi Tapia Rep   05/10/23 09:22 EDT

## 2023-05-11 RX ORDER — SERTRALINE HYDROCHLORIDE 100 MG/1
100 TABLET, FILM COATED ORAL DAILY
Qty: 90 TABLET | Refills: 0 | Status: SHIPPED | OUTPATIENT
Start: 2023-05-11

## 2023-05-11 NOTE — TELEPHONE ENCOUNTER
It has been quite sometime since I have seen Joaquín.  At least 6 months.  I will refill his medication but would like him to follow-up in the next month or so so we can make sure he is doing well.

## 2023-06-12 ENCOUNTER — OFFICE VISIT (OUTPATIENT)
Dept: FAMILY MEDICINE CLINIC | Facility: CLINIC | Age: 23
End: 2023-06-12
Payer: COMMERCIAL

## 2023-06-12 VITALS
OXYGEN SATURATION: 96 % | WEIGHT: 288 LBS | TEMPERATURE: 98.1 F | BODY MASS INDEX: 39.01 KG/M2 | SYSTOLIC BLOOD PRESSURE: 132 MMHG | HEART RATE: 78 BPM | DIASTOLIC BLOOD PRESSURE: 87 MMHG | HEIGHT: 72 IN

## 2023-06-12 DIAGNOSIS — F33.42 RECURRENT MAJOR DEPRESSIVE DISORDER, IN FULL REMISSION: Primary | ICD-10-CM

## 2023-06-12 PROCEDURE — 99213 OFFICE O/P EST LOW 20 MIN: CPT | Performed by: NURSE PRACTITIONER

## 2023-06-12 RX ORDER — SERTRALINE HYDROCHLORIDE 100 MG/1
100 TABLET, FILM COATED ORAL DAILY
Qty: 90 TABLET | Refills: 3 | Status: SHIPPED | OUTPATIENT
Start: 2023-06-12

## 2023-06-12 RX ORDER — SERTRALINE HYDROCHLORIDE 100 MG/1
100 TABLET, FILM COATED ORAL DAILY
Qty: 90 TABLET | Refills: 0 | Status: SHIPPED | OUTPATIENT
Start: 2023-06-12 | End: 2023-06-12 | Stop reason: SDUPTHER

## 2023-06-12 NOTE — PROGRESS NOTES
"Chief Complaint  Depression    Subjective          Joaquín Andrade presents to [facility]      History of Present Illness    Joaquín is a 22-year-old male patient who presents today for follow-up on depression.    Patient has been off and on Zoloft for the last 5 years.  I saw him last December and he was doing well.  He presents today with no issues or concerns.  He does state that he is taking his medication regularly every day not having any flareups or mood disturbances.  He denies any SI, HI, want to self-harm.  Has no other concerns or complaints today.      Objective     Vital Signs:   /87 (BP Location: Right arm, Patient Position: Sitting, Cuff Size: Adult)   Pulse 78   Temp 98.1 °F (36.7 °C) (Infrared)   Ht 182.9 cm (72.01\")   Wt 131 kg (288 lb)   SpO2 96%   BMI 39.05 kg/m²           Physical Exam  Vitals reviewed.   Constitutional:       Appearance: He is well-developed.      Comments: Wearing a face mask     HENT:      Head: Normocephalic and atraumatic.      Nose: Nose normal.      Mouth/Throat:      Mouth: Mucous membranes are moist.      Pharynx: Oropharynx is clear.   Eyes:      Conjunctiva/sclera: Conjunctivae normal.      Pupils: Pupils are equal, round, and reactive to light.   Cardiovascular:      Rate and Rhythm: Normal rate and regular rhythm.      Pulses: Normal pulses.      Heart sounds: Normal heart sounds.   Pulmonary:      Effort: Pulmonary effort is normal.      Breath sounds: Normal breath sounds.   Musculoskeletal:         General: Normal range of motion.      Cervical back: Normal range of motion.   Skin:     General: Skin is warm and dry.      Findings: No rash.   Neurological:      Mental Status: He is alert and oriented to person, place, and time.   Psychiatric:         Attention and Perception: Attention and perception normal.         Mood and Affect: Mood and affect normal.         Speech: Speech normal.         Behavior: Behavior " normal. Behavior is cooperative.         Thought Content: Thought content normal. Thought content is not paranoid or delusional. Thought content does not include homicidal or suicidal ideation. Thought content does not include homicidal or suicidal plan.         Cognition and Memory: Cognition and memory normal.                Result Review :                                   Assessment and Plan      Diagnoses and all orders for this visit:    1. Recurrent major depressive disorder, in full remission (Primary)  Assessment & Plan:  Patient's depression is recurrent and is mild without psychosis. Their depression is currently in full remission and the condition is improving with treatment. This will be reassessed at the next regular appointment. F/U as described:patient will continue current medication therapy and patient depression is being managed by their pcp.      Other orders  -     Discontinue: sertraline (Zoloft) 100 MG tablet; Take 1 tablet by mouth Daily.  Dispense: 90 tablet; Refill: 0  -     sertraline (Zoloft) 100 MG tablet; Take 1 tablet by mouth Daily.  Dispense: 90 tablet; Refill: 3      Joaquín is doing exceptionally well with his depression.  He has been in remission of symptoms for more than 6 months now.  Stable on Zoloft would like to see him back in 6 months.  Reiterated if symptoms worsen or he has any other issues to come back and see me sooner.            Follow Up       Return in about 6 months (around 12/12/2023) for with SURYA Roberts.      Patient was given instructions and counseling regarding his condition or for health maintenance advice. Please see specific information pulled into the AVS if appropriate.     Niya Andino, APRN6/12/202316:23 EDT  This note has been electronically signed

## 2023-06-12 NOTE — ASSESSMENT & PLAN NOTE
Patient's depression is recurrent and is mild without psychosis. Their depression is currently in full remission and the condition is improving with treatment. This will be reassessed at the next regular appointment. F/U as described:patient will continue current medication therapy and patient depression is being managed by their pcp.

## 2024-01-05 ENCOUNTER — OFFICE VISIT (OUTPATIENT)
Dept: FAMILY MEDICINE CLINIC | Facility: CLINIC | Age: 24
End: 2024-01-05
Payer: COMMERCIAL

## 2024-01-05 VITALS
HEART RATE: 77 BPM | TEMPERATURE: 97.3 F | DIASTOLIC BLOOD PRESSURE: 76 MMHG | OXYGEN SATURATION: 96 % | SYSTOLIC BLOOD PRESSURE: 138 MMHG | BODY MASS INDEX: 39.96 KG/M2 | HEIGHT: 72 IN | WEIGHT: 295 LBS

## 2024-01-05 DIAGNOSIS — F33.42 RECURRENT MAJOR DEPRESSIVE DISORDER, IN FULL REMISSION: ICD-10-CM

## 2024-01-05 DIAGNOSIS — Z11.59 ENCOUNTER FOR HEPATITIS C SCREENING TEST FOR LOW RISK PATIENT: ICD-10-CM

## 2024-01-05 DIAGNOSIS — Z00.00 ANNUAL PHYSICAL EXAM: Primary | ICD-10-CM

## 2024-01-05 DIAGNOSIS — E66.01 CLASS 3 SEVERE OBESITY WITHOUT SERIOUS COMORBIDITY WITH BODY MASS INDEX (BMI) OF 40.0 TO 44.9 IN ADULT, UNSPECIFIED OBESITY TYPE: ICD-10-CM

## 2024-01-05 DIAGNOSIS — Z23 NEED FOR INFLUENZA VACCINATION: ICD-10-CM

## 2024-01-05 PROBLEM — E66.813 CLASS 3 SEVERE OBESITY WITHOUT SERIOUS COMORBIDITY WITH BODY MASS INDEX (BMI) OF 40.0 TO 44.9 IN ADULT: Status: ACTIVE | Noted: 2024-01-05

## 2024-01-05 NOTE — PROGRESS NOTES
"Chief Complaint  Annual Exam        Joaquín Andrade presents to [facility] MEDICINE        Subjective      Joaquín is a 23-year-old male patient presents today for physical.    Reports he has been with recurrent illnesses recently more so than in years past. Mostly URI, colds, GI bug. With intermittent dry cough today, no other symptoms.     Family hx father DM, HTN. Mother anx/depression. His mental health is doing well on Zoloft.     He takes OTC vitamins and supplements including fish oil. He works full time as a .Goal weight 220 lbs. Has gained 7 lbs in 7 months since last visit.  He is in a few weeks going to start a workout group with his family.                       Objective         Vital Signs:     /76 (BP Location: Left arm, Patient Position: Sitting, Cuff Size: Adult)   Pulse 77   Temp 97.3 °F (36.3 °C) (Infrared)   Ht 182.9 cm (72.01\")   Wt 134 kg (295 lb)   SpO2 96%   BMI 40.00 kg/m²       Physical Exam  Vitals reviewed.   Constitutional:       Appearance: He is well-developed.      Comments:      HENT:      Head: Normocephalic and atraumatic.      Nose: Nose normal.      Mouth/Throat:      Mouth: Mucous membranes are moist.      Pharynx: Oropharynx is clear.   Eyes:      Conjunctiva/sclera: Conjunctivae normal.      Pupils: Pupils are equal, round, and reactive to light.   Cardiovascular:      Rate and Rhythm: Normal rate and regular rhythm.      Pulses: Normal pulses.      Heart sounds: Normal heart sounds.   Pulmonary:      Effort: Pulmonary effort is normal. No respiratory distress.      Breath sounds: Normal breath sounds. No stridor. No wheezing, rhonchi or rales.   Chest:      Chest wall: No tenderness.   Musculoskeletal:         General: Normal range of motion.      Cervical back: Normal range of motion.   Skin:     General: Skin is warm and dry.      Findings: No rash.   Neurological:      Mental Status: He is alert and oriented to " person, place, and time.   Psychiatric:         Attention and Perception: Attention and perception normal.         Mood and Affect: Mood and affect normal.         Speech: Speech normal.         Behavior: Behavior normal. Behavior is uncooperative. Behavior is cooperative.         Thought Content: Thought content normal.                History of Present Illness      Patient Active Problem List   Diagnosis    Anxiety    Insomnia due to other mental disorder    Body mass index (BMI) of 38.0 to 38.9 in adult    Severe episode of recurrent major depressive disorder, without psychotic features    Annual physical exam    Acute right-sided thoracic back pain    Other idiopathic scoliosis, thoracic region    DDD (degenerative disc disease), thoracic    Family history of diabetes mellitus    Family history of high cholesterol    Need for influenza vaccination    Need for vaccine for DT (diphtheria-tetanus)    Need for pneumococcal vaccination    Body mass index (BMI) of 40.0 to 44.9 in adult    Encounter for hepatitis C screening test for low risk patient    Class 3 severe obesity without serious comorbidity with body mass index (BMI) of 40.0 to 44.9 in adult    Recurrent major depressive disorder, in full remission         Past Medical History:   Diagnosis Date    Anxiety     Depression     Family history of diabetes mellitus in first degree relative 8/2/2018    PTSD (post-traumatic stress disorder) 01/19/2022    [facility]           Family History   Problem Relation Age of Onset    Anxiety disorder Mother     Depression Mother     Diabetes Father           History reviewed. No pertinent surgical history.       Social History     Socioeconomic History    Marital status: Single   Tobacco Use    Smoking status: Some Days     Types: Cigars    Smokeless tobacco: Never    Tobacco comments:     rarely    Vaping Use    Vaping Use: Never used   Substance and Sexual Activity    Alcohol use: Yes     Comment: occasionally     Drug  use: Never    Sexual activity: Yes     Partners: Female                    Result Review :                                  Class 3 Severe Obesity (BMI >=40). Obesity-related health conditions include the following: none. Obesity is newly identified. BMI is is above average; BMI management plan is completed. We discussed portion control and increasing exercise.               Assessment and Plan      Diagnoses and all orders for this visit:    1. Annual physical exam (Primary)    2. Body mass index (BMI) of 40.0 to 44.9 in adult  -     CBC & Differential  -     Comprehensive metabolic panel  -     Hemoglobin A1c  -     Vitamin D,25-Hydroxy  -     Vitamin B12    3. Need for influenza vaccination  -     Fluzone (or Fluarix & Flulaval for VFC) >6mos    4. Encounter for hepatitis C screening test for low risk patient  -     Hepatitis C antibody    5. Recurrent major depressive disorder, in full remission  -     CBC & Differential  -     Comprehensive metabolic panel  -     Vitamin D,25-Hydroxy  -     Vitamin B12    6. Class 3 severe obesity without serious comorbidity with body mass index (BMI) of 40.0 to 44.9 in adult, unspecified obesity type      Patient here for annual physical.  No major concerns or issues, he has been with some recent URIs and other colds.  Will go ahead and obtain lab work to assess CBC CMP check for any underlying prediabetes diabetes and also any vitamin deficiencies.  He did get his flu vaccine today.  Depression stable doing well.  He is with some obesity to 95 pounds BMI of 40 his goal weight is 220 pounds.  He is going to start a workout group soon with his family.      The patient was counseled regarding nutrition, physical activity, healthy weight, injury prevention, misuse of tobacco, alcohol and illicit drugs, sexual behavior and STI's, contraception, dental health, mental health, immunizations, and screenings.                Follow Up       No follow-ups on file.      Patient was given  instructions and counseling regarding his condition or for health maintenance advice. Please see specific information pulled into the AVS if appropriate.     Niya Andino, APRN1/5/202411:27 EST  This note has been electronically signed

## 2024-01-06 LAB
25(OH)D3+25(OH)D2 SERPL-MCNC: 16 NG/ML (ref 30–100)
ALBUMIN SERPL-MCNC: 4 G/DL (ref 4.3–5.2)
ALBUMIN/GLOB SERPL: 1.7 {RATIO} (ref 1.2–2.2)
ALP SERPL-CCNC: 93 IU/L (ref 44–121)
ALT SERPL-CCNC: 25 IU/L (ref 0–44)
AST SERPL-CCNC: 19 IU/L (ref 0–40)
BASOPHILS # BLD AUTO: 0 X10E3/UL (ref 0–0.2)
BASOPHILS NFR BLD AUTO: 1 %
BILIRUB SERPL-MCNC: 0.4 MG/DL (ref 0–1.2)
BUN SERPL-MCNC: 11 MG/DL (ref 6–20)
BUN/CREAT SERPL: 14 (ref 9–20)
CALCIUM SERPL-MCNC: 9.4 MG/DL (ref 8.7–10.2)
CHLORIDE SERPL-SCNC: 105 MMOL/L (ref 96–106)
CO2 SERPL-SCNC: 24 MMOL/L (ref 20–29)
CREAT SERPL-MCNC: 0.81 MG/DL (ref 0.76–1.27)
EGFRCR SERPLBLD CKD-EPI 2021: 126 ML/MIN/1.73
EOSINOPHIL # BLD AUTO: 0.2 X10E3/UL (ref 0–0.4)
EOSINOPHIL NFR BLD AUTO: 3 %
ERYTHROCYTE [DISTWIDTH] IN BLOOD BY AUTOMATED COUNT: 13.7 % (ref 11.6–15.4)
GLOBULIN SER CALC-MCNC: 2.4 G/DL (ref 1.5–4.5)
GLUCOSE SERPL-MCNC: 100 MG/DL (ref 70–99)
HBA1C MFR BLD: 5.8 % (ref 4.8–5.6)
HCT VFR BLD AUTO: 45.3 % (ref 37.5–51)
HCV IGG SERPL QL IA: NON REACTIVE
HGB BLD-MCNC: 14.8 G/DL (ref 13–17.7)
IMM GRANULOCYTES # BLD AUTO: 0.1 X10E3/UL (ref 0–0.1)
IMM GRANULOCYTES NFR BLD AUTO: 1 %
LYMPHOCYTES # BLD AUTO: 2.5 X10E3/UL (ref 0.7–3.1)
LYMPHOCYTES NFR BLD AUTO: 34 %
MCH RBC QN AUTO: 26.4 PG (ref 26.6–33)
MCHC RBC AUTO-ENTMCNC: 32.7 G/DL (ref 31.5–35.7)
MCV RBC AUTO: 81 FL (ref 79–97)
MONOCYTES # BLD AUTO: 0.6 X10E3/UL (ref 0.1–0.9)
MONOCYTES NFR BLD AUTO: 9 %
NEUTROPHILS # BLD AUTO: 3.8 X10E3/UL (ref 1.4–7)
NEUTROPHILS NFR BLD AUTO: 52 %
PLATELET # BLD AUTO: 299 X10E3/UL (ref 150–450)
POTASSIUM SERPL-SCNC: 4.9 MMOL/L (ref 3.5–5.2)
PROT SERPL-MCNC: 6.4 G/DL (ref 6–8.5)
RBC # BLD AUTO: 5.61 X10E6/UL (ref 4.14–5.8)
SODIUM SERPL-SCNC: 142 MMOL/L (ref 134–144)
VIT B12 SERPL-MCNC: 399 PG/ML (ref 232–1245)
WBC # BLD AUTO: 7.3 X10E3/UL (ref 3.4–10.8)

## 2024-01-08 DIAGNOSIS — E55.9 VITAMIN D DEFICIENCY: Primary | ICD-10-CM

## 2024-01-08 RX ORDER — ERGOCALCIFEROL 1.25 MG/1
50000 CAPSULE ORAL WEEKLY
Qty: 12 CAPSULE | Refills: 3 | Status: SHIPPED | OUTPATIENT
Start: 2024-01-08

## 2024-01-08 NOTE — PROGRESS NOTES
Labs have returned with a few abnormalities. He is vitamin D deficient with a value of 16. I would like to send in prescription supplementation to the pharmacy for him to take once weekly.  We can recheck levels again in 3 to 6 months to see where they are.  Additionally, he is prediabetic with a value of 5.8.  Initial recommendations are dietary and lifestyle modification including, limiting carbohydrates, increasing protein, exercising 30 minutes a day 5 days a week with a maximum target heart rate of 196.

## 2024-01-09 ENCOUNTER — TELEPHONE (OUTPATIENT)
Dept: FAMILY MEDICINE CLINIC | Facility: CLINIC | Age: 24
End: 2024-01-09
Payer: COMMERCIAL

## 2024-01-09 NOTE — TELEPHONE ENCOUNTER
Caller: Joaquín Andrade    Relationship: Self    Best call back number:    [phone] (Mobile)       What form or medical record are you requesting: WORK NOTE     Who is requesting this form or medical record from you: PATIENT     How would you like to receive the form or medical records (pick-up, mail, fax): FAX  If fax, what is the fax number:[phone]    Timeframe paperwork needed: ASAP    Additional notes: PATIENT CALLED FOR A WORK NOTE TO BE FAXED TO HIS EMPLOYER.         THANKS

## 2024-01-09 NOTE — TELEPHONE ENCOUNTER
Could you please provide a work note stating the date the patient was here and that he is to return as normally scheduled

## 2024-07-18 ENCOUNTER — OFFICE VISIT (OUTPATIENT)
Dept: FAMILY MEDICINE CLINIC | Facility: CLINIC | Age: 24
End: 2024-07-18
Payer: COMMERCIAL

## 2024-07-18 ENCOUNTER — TELEPHONE (OUTPATIENT)
Dept: FAMILY MEDICINE CLINIC | Facility: CLINIC | Age: 24
End: 2024-07-18

## 2024-07-18 VITALS
HEIGHT: 72 IN | TEMPERATURE: 98.1 F | HEART RATE: 77 BPM | WEIGHT: 289 LBS | BODY MASS INDEX: 39.14 KG/M2 | OXYGEN SATURATION: 99 % | SYSTOLIC BLOOD PRESSURE: 151 MMHG | DIASTOLIC BLOOD PRESSURE: 87 MMHG

## 2024-07-18 DIAGNOSIS — E55.9 VITAMIN D DEFICIENCY: ICD-10-CM

## 2024-07-18 DIAGNOSIS — T78.40XA ALLERGIC REACTION, INITIAL ENCOUNTER: Primary | ICD-10-CM

## 2024-07-18 PROCEDURE — 99213 OFFICE O/P EST LOW 20 MIN: CPT | Performed by: NURSE PRACTITIONER

## 2024-07-18 RX ORDER — EPINEPHRINE 0.3 MG/.3ML
0.3 INJECTION SUBCUTANEOUS ONCE AS NEEDED
Qty: 1 EACH | Refills: 1 | Status: SHIPPED | OUTPATIENT
Start: 2024-07-18

## 2024-07-18 RX ORDER — ERGOCALCIFEROL 1.25 MG/1
50000 CAPSULE ORAL WEEKLY
Qty: 12 CAPSULE | Refills: 3 | Status: SHIPPED | OUTPATIENT
Start: 2024-07-18

## 2024-07-18 RX ORDER — ALBUTEROL SULFATE 90 UG/1
2 AEROSOL, METERED RESPIRATORY (INHALATION) EVERY 4 HOURS PRN
Qty: 18 G | Refills: 1 | Status: SHIPPED | OUTPATIENT
Start: 2024-07-18

## 2024-07-18 RX ORDER — SERTRALINE HYDROCHLORIDE 100 MG/1
100 TABLET, FILM COATED ORAL DAILY
Qty: 90 TABLET | Refills: 3 | Status: SHIPPED | OUTPATIENT
Start: 2024-07-18

## 2024-07-18 NOTE — PROGRESS NOTES
"Chief Complaint  Allergic Reaction        Joaquín Andrade presents to [facility] MEDIC[facility]        Subjective      23-year-old male patient presents today with complaints of breathing difficulty yesterday.    Pulling plywood and insulation yesterday. \"I had an allergic reaction\". Reports face, throat, and tongue swelling\". He used an albuterol inhaler, benadryl and Zyrtec. Took two hours to feel \"okay\" after reaction. He is with sore throat today.                       Objective         Vital Signs:     /87 (BP Location: Right arm, Patient Position: Sitting, Cuff Size: Adult)   Pulse 77   Temp 98.1 °F (36.7 °C) (Infrared)   Ht 182.9 cm (72.01\")   Wt 131 kg (289 lb)   SpO2 99%   BMI 39.19 kg/m²       Physical Exam  Vitals reviewed.   Constitutional:       Appearance: He is well-developed.      Comments:      HENT:      Head: Normocephalic and atraumatic.      Nose: Nose normal. No congestion or rhinorrhea.      Mouth/Throat:      Mouth: Mucous membranes are moist.      Pharynx: Oropharynx is clear. No oropharyngeal exudate or posterior oropharyngeal erythema.   Eyes:      Conjunctiva/sclera: Conjunctivae normal.      Pupils: Pupils are equal, round, and reactive to light.   Cardiovascular:      Rate and Rhythm: Normal rate and regular rhythm.      Pulses: Normal pulses.      Heart sounds: Normal heart sounds.   Pulmonary:      Effort: Pulmonary effort is normal. No respiratory distress.      Breath sounds: Normal breath sounds. No stridor. No wheezing, rhonchi or rales.   Chest:      Chest wall: No tenderness.   Musculoskeletal:         General: Normal range of motion.      Cervical back: Normal range of motion.   Skin:     General: Skin is warm and dry.      Findings: No rash.   Neurological:      Mental Status: He is alert and oriented to person, place, and time.   Psychiatric:         Behavior: Behavior normal.                History of Present Illness      Patient Active " Problem List   Diagnosis    Anxiety    Insomnia due to other mental disorder    Body mass index (BMI) of 38.0 to 38.9 in adult    Severe episode of recurrent major depressive disorder, without psychotic features    Annual physical exam    Acute right-sided thoracic back pain    Other idiopathic scoliosis, thoracic region    DDD (degenerative disc disease), thoracic    Family history of diabetes mellitus    Family history of high cholesterol    Need for influenza vaccination    Need for vaccine for DT (diphtheria-tetanus)    Need for pneumococcal vaccination    Body mass index (BMI) of 40.0 to 44.9 in adult    Encounter for hepatitis C screening test for low risk patient    Class 3 severe obesity without serious comorbidity with body mass index (BMI) of 40.0 to 44.9 in adult    Recurrent major depressive disorder, in full remission    Vitamin D deficiency    Allergic reaction         Past Medical History:   Diagnosis Date    Anxiety     Depression     Family history of diabetes mellitus in first degree relative 8/2/2018    PTSD (post-traumatic stress disorder) 01/19/2022    [facility]           Family History   Problem Relation Age of Onset    Anxiety disorder Mother     Depression Mother     Diabetes Father           History reviewed. No pertinent surgical history.       Social History     Socioeconomic History    Marital status: Single   Tobacco Use    Smoking status: Some Days     Types: Cigars     Passive exposure: Current    Smokeless tobacco: Never    Tobacco comments:     rarely    Vaping Use    Vaping status: Never Used   Substance and Sexual Activity    Alcohol use: Yes     Comment: occasionally     Drug use: Never    Sexual activity: Yes     Partners: Female                    Result Review :                                                  Assessment and Plan      Diagnoses and all orders for this visit:    1. Allergic reaction, initial encounter (Primary)  -     EPINEPHrine (EpiPen 2-Lino) 0.3 MG/0.3ML  solution auto-injector injection; Inject 0.3 mL into the appropriate muscle as directed by prescriber 1 (One) Time As Needed (anaphylaxis) for up to 2 doses.  Dispense: 1 each; Refill: 1  -     albuterol sulfate  (90 Base) MCG/ACT inhaler; Inhale 2 puffs Every 4 (Four) Hours As Needed for Wheezing or Shortness of Air.  Dispense: 18 g; Refill: 1    2. Vitamin D deficiency  Comments:  DX 1/8/23  Orders:  -     vitamin D (ERGOCALCIFEROL) 1.25 MG (98479 UT) capsule capsule; Take 1 capsule by mouth 1 (One) Time Per Week.  Dispense: 12 capsule; Refill: 3    Other orders  -     sertraline (Zoloft) 100 MG tablet; Take 1 tablet by mouth Daily.  Dispense: 90 tablet; Refill: 3        Patient without any symptoms today aside from sore throat.  Lungs are clear to auscultation.  Will go ahead and send an EpiPen with a refill in case this reaction occurs again.  Will let him have a albuterol inhaler in case any flareups with the weather or other allergens.  Did discussed referring to ENT to see if there is any other underlying issues.  Patient declined right now but will call back if he changes mind.  Patient is in need of refill of his Zoloft and vitamin D.                  Follow Up       No follow-ups on file.      Patient was given instructions and counseling regarding his condition or for health maintenance advice. Please see specific information pulled into the AVS if appropriate.     Niya Andino, APRN7/18/202414:37 EDT  This note has been electronically signed

## 2024-07-25 ENCOUNTER — TELEPHONE (OUTPATIENT)
Dept: FAMILY MEDICINE CLINIC | Facility: CLINIC | Age: 24
End: 2024-07-25
Payer: COMMERCIAL

## 2024-07-25 NOTE — TELEPHONE ENCOUNTER
Caller: Joaquín Andrade    Relationship: Self    Best call back number: [phone]     What is the best time to reach you: ANY    Who are you requesting to speak with (clinical staff, provider,  specific staff member): PCP    Do you know the name of the person who called:     What was the call regarding: PATIENT WAS SEEN IN THE OFFICE ON 7/18/24 AND RECEIVED A DOCTORS EXCUSE FOR WORK BUT THE NOTE NEEDS TO SPECIFY THAT HE WAS SEEN AT THE DOCTOR ON THAT DAY AND HE NEEDED TO BE OFF WORK FOR THAT DAY AS WELL.    Is it okay if the provider responds through MyChart:

## 2025-07-09 ENCOUNTER — OFFICE VISIT (OUTPATIENT)
Dept: FAMILY MEDICINE CLINIC | Facility: CLINIC | Age: 25
End: 2025-07-09
Payer: COMMERCIAL

## 2025-07-09 VITALS
TEMPERATURE: 97.3 F | RESPIRATION RATE: 16 BRPM | OXYGEN SATURATION: 96 % | BODY MASS INDEX: 38.82 KG/M2 | HEIGHT: 72 IN | HEART RATE: 87 BPM | WEIGHT: 286.6 LBS | DIASTOLIC BLOOD PRESSURE: 69 MMHG | SYSTOLIC BLOOD PRESSURE: 130 MMHG

## 2025-07-09 DIAGNOSIS — E56.9 VITAMIN DEFICIENCY: ICD-10-CM

## 2025-07-09 DIAGNOSIS — Z00.00 PREVENTATIVE HEALTH CARE: ICD-10-CM

## 2025-07-09 DIAGNOSIS — E55.9 VITAMIN D DEFICIENCY: ICD-10-CM

## 2025-07-09 DIAGNOSIS — R53.83 OTHER FATIGUE: ICD-10-CM

## 2025-07-09 DIAGNOSIS — T78.40XA ALLERGIC REACTION, INITIAL ENCOUNTER: ICD-10-CM

## 2025-07-09 DIAGNOSIS — E78.2 MIXED HYPERLIPIDEMIA: ICD-10-CM

## 2025-07-09 DIAGNOSIS — Z83.3 FAMILY HISTORY OF DIABETES MELLITUS: Primary | ICD-10-CM

## 2025-07-09 DIAGNOSIS — L03.114 CELLULITIS OF LEFT UPPER EXTREMITY: ICD-10-CM

## 2025-07-09 DIAGNOSIS — F41.9 ANXIETY: ICD-10-CM

## 2025-07-09 RX ORDER — MUPIROCIN 2 %
OINTMENT (GRAM) TOPICAL
COMMUNITY
Start: 2025-07-08

## 2025-07-09 RX ORDER — SERTRALINE HYDROCHLORIDE 100 MG/1
100 TABLET, FILM COATED ORAL DAILY
Qty: 90 TABLET | Refills: 1 | Status: SHIPPED | OUTPATIENT
Start: 2025-07-09

## 2025-07-09 RX ORDER — EPINEPHRINE 0.3 MG/.3ML
0.3 INJECTION SUBCUTANEOUS ONCE AS NEEDED
Qty: 1 EACH | Refills: 1 | Status: SHIPPED | OUTPATIENT
Start: 2025-07-09

## 2025-07-09 RX ORDER — METHYLPREDNISOLONE 4 MG/1
TABLET ORAL
COMMUNITY
Start: 2025-07-08

## 2025-07-09 RX ORDER — ERGOCALCIFEROL 1.25 MG/1
50000 CAPSULE, LIQUID FILLED ORAL WEEKLY
Qty: 12 CAPSULE | Refills: 3 | Status: SHIPPED | OUTPATIENT
Start: 2025-07-09

## 2025-07-09 RX ORDER — CEPHALEXIN 500 MG/1
CAPSULE ORAL
COMMUNITY
Start: 2025-07-08

## 2025-07-09 NOTE — PROGRESS NOTES
"    Joaquín Andrade is a 24 y.o. male.     History of Present Illness  24-year-old white male with history of anxiety, prediabetes and hyperlipidemia comes in today to be established as a new patient    Blood pressure 130/68 heart rate 86 he denies any chest pain, dyspnea, tachycardia or dizziness    Patient just had recent ER visit.  He has a puncture angel right below his left elbow which initially turned red and then his whole forearm and elbow swelled up turn red and got hot.  He was placed on Keflex and steroids and I advised him to do ibuprofen 3 times a day for several days.  He is going to go ahead and go back to work since it is feeling better and the swelling has gone down and does not need a work note    He has no other new complaints.  Weight is 287 with BMI of 38.9.  He has had 2 COVID vaccines up-to-date on eye exams            Fasting blood work  Ibuprofen 800 mg 3 times daily times several days  Finish steroids and Keflex  Call office if no improvement         The following portions of the patient's history were reviewed and updated as appropriate: allergies, current medications, past family history, past medical history, past social history, past surgical history, and problem list.    Vitals:    07/09/25 1458   BP: 130/69   BP Location: Right arm   Patient Position: Sitting   Cuff Size: Large Adult   Pulse: 87   Resp: 16   Temp: 97.3 °F (36.3 °C)   SpO2: 96%   Weight: 130 kg (286 lb 9.6 oz)   Height: 182.9 cm (72.01\")       Past Medical History:   Diagnosis Date    Anxiety     Depression     Family history of diabetes mellitus in first degree relative 8/2/2018    PTSD (post-traumatic stress disorder) 01/19/2022    [facility]      No past surgical history on file.  Family History   Problem Relation Age of Onset    Anxiety disorder Mother     Depression Mother     Diabetes Father      Immunization History   Administered Date(s) Administered    Covid-19 (Pfizer) Gray Cap Monovalent 04/18/2022, " 05/20/2022    DTaP 1999, 1999, 03/09/2000, 04/10/2001, 08/04/2004    DTaP, Unspecified 08/04/2004    Fluzone (or Fluarix & Flulaval for VFC) >6mos 12/05/2022, 01/05/2024    Hep B, Adolescent or Pediatric 03/09/2000, 04/10/2000, 01/04/2001    Hib (PRP-T) 1999, 03/09/2000, 04/10/2001    IPV 1999, 1999, 08/28/2000, 08/04/2004    MMR 08/28/2000, 08/04/2004    Meningococcal MCV4P (Menactra) 08/25/2010    PEDS-Pneumococcal Conjugate (PCV7) 06/22/2000, 01/04/2001    PPD Test 01/03/2017    Pneumococcal Polysaccharide (PPSV23) 12/05/2022    Tdap 08/25/2010, 12/05/2022    Varicella 08/28/2000, 08/25/2010       Office Visit on 01/05/2024   Component Date Value Ref Range Status    WBC 01/05/2024 7.3  3.4 - 10.8 x10E3/uL Final    RBC 01/05/2024 5.61  4.14 - 5.80 x10E6/uL Final    Hemoglobin 01/05/2024 14.8  13.0 - 17.7 g/dL Final    Hematocrit 01/05/2024 45.3  37.5 - 51.0 % Final    MCV 01/05/2024 81  79 - 97 fL Final    MCH 01/05/2024 26.4 (L)  26.6 - 33.0 pg Final    MCHC 01/05/2024 32.7  31.5 - 35.7 g/dL Final    RDW 01/05/2024 13.7  11.6 - 15.4 % Final    Platelets 01/05/2024 299  150 - 450 x10E3/uL Final    Neutrophil Rel % 01/05/2024 52  Not Estab. % Final    Lymphocyte Rel % 01/05/2024 34  Not Estab. % Final    Monocyte Rel % 01/05/2024 9  Not Estab. % Final    Eosinophil Rel % 01/05/2024 3  Not Estab. % Final    Basophil Rel % 01/05/2024 1  Not Estab. % Final    Neutrophils Absolute 01/05/2024 3.8  1.4 - 7.0 x10E3/uL Final    Lymphocytes Absolute 01/05/2024 2.5  0.7 - 3.1 x10E3/uL Final    Monocytes Absolute 01/05/2024 0.6  0.1 - 0.9 x10E3/uL Final    Eosinophils Absolute 01/05/2024 0.2  0.0 - 0.4 x10E3/uL Final    Basophils Absolute 01/05/2024 0.0  0.0 - 0.2 x10E3/uL Final    Immature Granulocyte Rel % 01/05/2024 1  Not Estab. % Final    Immature Grans Absolute 01/05/2024 0.1  0.0 - 0.1 x10E3/uL Final    Glucose 01/05/2024 100 (H)  70 - 99 mg/dL Final    BUN 01/05/2024 11  6 - 20 mg/dL  Final    Creatinine 01/05/2024 0.81  0.76 - 1.27 mg/dL Final    EGFR Result 01/05/2024 126  >59 mL/min/1.73 Final    BUN/Creatinine Ratio 01/05/2024 14  9 - 20 Final    Sodium 01/05/2024 142  134 - 144 mmol/L Final    Potassium 01/05/2024 4.9  3.5 - 5.2 mmol/L Final    Chloride 01/05/2024 105  96 - 106 mmol/L Final    Total CO2 01/05/2024 24  20 - 29 mmol/L Final    Calcium 01/05/2024 9.4  8.7 - 10.2 mg/dL Final    Total Protein 01/05/2024 6.4  6.0 - 8.5 g/dL Final    Albumin 01/05/2024 4.0 (L)  4.3 - 5.2 g/dL Final    Globulin 01/05/2024 2.4  1.5 - 4.5 g/dL Final    A/G Ratio 01/05/2024 1.7  1.2 - 2.2 Final    Total Bilirubin 01/05/2024 0.4  0.0 - 1.2 mg/dL Final    Alkaline Phosphatase 01/05/2024 93  44 - 121 IU/L Final    AST (SGOT) 01/05/2024 19  0 - 40 IU/L Final    ALT (SGPT) 01/05/2024 25  0 - 44 IU/L Final    Hemoglobin A1C 01/05/2024 5.8 (H)  4.8 - 5.6 % Final    Comment:          Prediabetes: 5.7 - 6.4           Diabetes: >6.4           Glycemic control for adults with diabetes: <7.0      Hep C Virus Ab 01/05/2024 Non Reactive  Non Reactive Final    Comment: HCV antibody alone does not differentiate between previously  resolved infection and active infection. Equivocal and Reactive  HCV antibody results should be followed up with an HCV RNA test  to support the diagnosis of active HCV infection.      25 Hydroxy, Vitamin D 01/05/2024 16.0 (L)  30.0 - 100.0 ng/mL Final    Comment: Vitamin D deficiency has been defined by the [place] of  [employer]icine and an Endocrine Society practice guideline as a  level of serum 25-OH vitamin D less than 20 ng/mL (1,2).  The Endocrine Society went on to further define vitamin D  insufficiency as a level between 21 and 29 ng/mL (2).  1. IOM ([place] of [employer]). 2010. Dietary reference     intakes for calcium and D. [place]: The     National Academies Press.  2. Willy CHRISTIE, Mariam GONSALEZ, Arsenio TANG, et al.     Evaluation, treatment, and prevention of vitamin D      deficiency: an Endocrine Society clinical practice     guideline. JCEM. 2011 Jul; 96(7):1911-30.      Vitamin B-12 01/05/2024 399  232 - 1,245 pg/mL Final         Review of Systems   Constitutional: Negative.    HENT: Negative.     Respiratory: Negative.     Cardiovascular: Negative.    Gastrointestinal: Negative.    Genitourinary: Negative.    Musculoskeletal: Negative.    Skin:         Left forearm infection   Neurological: Negative.    Psychiatric/Behavioral: Negative.         Objective   Physical Exam  Constitutional:       Appearance: Normal appearance.   HENT:      Head: Normocephalic.   Cardiovascular:      Rate and Rhythm: Normal rate and regular rhythm.      Pulses: Normal pulses.      Heart sounds: Normal heart sounds.   Pulmonary:      Effort: Pulmonary effort is normal.      Breath sounds: Normal breath sounds.   Abdominal:      General: Bowel sounds are normal.   Musculoskeletal:         General: Normal range of motion.   Skin:     Comments: Left forearm and elbow red swollen warm to touch   Neurological:      General: No focal deficit present.      Mental Status: He is alert and oriented to person, place, and time.   Psychiatric:         Mood and Affect: Mood normal.         Behavior: Behavior normal.         Procedures    Assessment & Plan   Diagnoses and all orders for this visit:    1. Family history of diabetes mellitus (Primary)  -     Comprehensive Metabolic Panel; Future  -     Hemoglobin A1c; Future    2. Allergic reaction, initial encounter  -     EPINEPHrine (EpiPen 2-Lino) 0.3 MG/0.3ML solution auto-injector injection; Inject 0.3 mL into the appropriate muscle as directed by prescriber 1 (One) Time As Needed (anaphylaxis) for up to 2 doses.  Dispense: 1 each; Refill: 1    3. Vitamin D deficiency  Comments:  SHON 1/8/23  Orders:  -     vitamin D (ERGOCALCIFEROL) 1.25 MG (14487 UT) capsule capsule; Take 1 capsule by mouth 1 (One) Time Per Week.  Dispense: 12 capsule; Refill: 3    4. Vitamin  deficiency    5. Other fatigue  -     TSH+Free T4; Future  -     T3; Future    6. Mixed hyperlipidemia  -     Lipid Panel With LDL / HDL Ratio; Future    7. Preventative health care  -     CBC & Differential; Future    Other orders  -     sertraline (Zoloft) 100 MG tablet; Take 1 tablet by mouth Daily.  Dispense: 90 tablet; Refill: 1           Current Outpatient Medications:     cephalexin (KEFLEX) 500 MG capsule, , Disp: , Rfl:     EPINEPHrine (EpiPen 2-Lino) 0.3 MG/0.3ML solution auto-injector injection, Inject 0.3 mL into the appropriate muscle as directed by prescriber 1 (One) Time As Needed (anaphylaxis) for up to 2 doses., Disp: 1 each, Rfl: 1    methylPREDNISolone (MEDROL) 4 MG dose pack, , Disp: , Rfl:     mupirocin (BACTROBAN) 2 % ointment, , Disp: , Rfl:     sertraline (Zoloft) 100 MG tablet, Take 1 tablet by mouth Daily., Disp: 90 tablet, Rfl: 1    vitamin D (ERGOCALCIFEROL) 1.25 MG (17308 UT) capsule capsule, Take 1 capsule by mouth 1 (One) Time Per Week., Disp: 12 capsule, Rfl: 3    albuterol sulfate  (90 Base) MCG/ACT inhaler, Inhale 2 puffs Every 4 (Four) Hours As Needed for Wheezing or Shortness of Air., Disp: 18 g, Rfl: 1           Linda Hilton, DUSTY 7/9/2025 15:16 EDT  This note has been electronically signed

## 2025-07-09 NOTE — PATIENT INSTRUCTIONS
Fasting blood work  Ibuprofen 800 mg 3 times daily times several days  Finish steroids and Keflex  Call office if no improvement

## 2025-07-12 LAB
ALBUMIN SERPL-MCNC: 4.2 G/DL (ref 4.3–5.2)
ALP SERPL-CCNC: 94 IU/L (ref 44–121)
ALT SERPL-CCNC: 25 IU/L (ref 0–44)
AST SERPL-CCNC: 20 IU/L (ref 0–40)
BASOPHILS # BLD AUTO: 0.1 X10E3/UL (ref 0–0.2)
BASOPHILS NFR BLD AUTO: 1 %
BILIRUB SERPL-MCNC: 0.4 MG/DL (ref 0–1.2)
BUN SERPL-MCNC: 16 MG/DL (ref 6–20)
BUN/CREAT SERPL: 17 (ref 9–20)
CALCIUM SERPL-MCNC: 9.1 MG/DL (ref 8.7–10.2)
CHLORIDE SERPL-SCNC: 102 MMOL/L (ref 96–106)
CHOLEST SERPL-MCNC: 162 MG/DL (ref 100–199)
CO2 SERPL-SCNC: 23 MMOL/L (ref 20–29)
CREAT SERPL-MCNC: 0.93 MG/DL (ref 0.76–1.27)
EGFRCR SERPLBLD CKD-EPI 2021: 117 ML/MIN/1.73
EOSINOPHIL # BLD AUTO: 0.1 X10E3/UL (ref 0–0.4)
EOSINOPHIL NFR BLD AUTO: 1 %
ERYTHROCYTE [DISTWIDTH] IN BLOOD BY AUTOMATED COUNT: 13.8 % (ref 11.6–15.4)
GLOBULIN SER CALC-MCNC: 2.5 G/DL (ref 1.5–4.5)
GLUCOSE SERPL-MCNC: 93 MG/DL (ref 70–99)
HBA1C MFR BLD: 5.6 % (ref 4.8–5.6)
HCT VFR BLD AUTO: 46.9 % (ref 37.5–51)
HDLC SERPL-MCNC: 29 MG/DL
HGB BLD-MCNC: 13.7 G/DL (ref 13–17.7)
IMM GRANULOCYTES # BLD AUTO: 0 X10E3/UL (ref 0–0.1)
IMM GRANULOCYTES NFR BLD AUTO: 1 %
LDLC SERPL CALC-MCNC: 103 MG/DL (ref 0–99)
LDLC/HDLC SERPL: 3.6 RATIO (ref 0–3.6)
LYMPHOCYTES # BLD AUTO: 3.4 X10E3/UL (ref 0.7–3.1)
LYMPHOCYTES NFR BLD AUTO: 40 %
MCH RBC QN AUTO: 26.6 PG (ref 26.6–33)
MCHC RBC AUTO-ENTMCNC: 29.2 G/DL (ref 31.5–35.7)
MCV RBC AUTO: 91 FL (ref 79–97)
MONOCYTES # BLD AUTO: 0.8 X10E3/UL (ref 0.1–0.9)
MONOCYTES NFR BLD AUTO: 9 %
NEUTROPHILS # BLD AUTO: 4.1 X10E3/UL (ref 1.4–7)
NEUTROPHILS NFR BLD AUTO: 48 %
PLATELET # BLD AUTO: 292 X10E3/UL (ref 150–450)
POTASSIUM SERPL-SCNC: 4.6 MMOL/L (ref 3.5–5.2)
PROT SERPL-MCNC: 6.7 G/DL (ref 6–8.5)
RBC # BLD AUTO: 5.16 X10E6/UL (ref 4.14–5.8)
SODIUM SERPL-SCNC: 140 MMOL/L (ref 134–144)
T3 SERPL-MCNC: 133 NG/DL (ref 71–180)
T4 FREE SERPL-MCNC: 1.13 NG/DL (ref 0.82–1.77)
TRIGL SERPL-MCNC: 168 MG/DL (ref 0–149)
TSH SERPL DL<=0.005 MIU/L-ACNC: 3.36 UIU/ML (ref 0.45–4.5)
VLDLC SERPL CALC-MCNC: 30 MG/DL (ref 5–40)
WBC # BLD AUTO: 8.5 X10E3/UL (ref 3.4–10.8)